# Patient Record
Sex: FEMALE | Race: WHITE | NOT HISPANIC OR LATINO | Employment: UNEMPLOYED | URBAN - METROPOLITAN AREA
[De-identification: names, ages, dates, MRNs, and addresses within clinical notes are randomized per-mention and may not be internally consistent; named-entity substitution may affect disease eponyms.]

---

## 2017-03-26 ENCOUNTER — COMMUNICATION - HEALTHEAST (OUTPATIENT)
Dept: FAMILY MEDICINE | Facility: CLINIC | Age: 47
End: 2017-03-26

## 2017-03-26 DIAGNOSIS — F34.1 DYSTHYMIC DISORDER: ICD-10-CM

## 2017-06-23 ENCOUNTER — COMMUNICATION - HEALTHEAST (OUTPATIENT)
Dept: FAMILY MEDICINE | Facility: CLINIC | Age: 47
End: 2017-06-23

## 2017-06-23 DIAGNOSIS — F34.1 DYSTHYMIC DISORDER: ICD-10-CM

## 2017-08-01 ENCOUNTER — OFFICE VISIT - HEALTHEAST (OUTPATIENT)
Dept: FAMILY MEDICINE | Facility: CLINIC | Age: 47
End: 2017-08-01

## 2017-08-01 DIAGNOSIS — R47.02 DYSPHASIA: ICD-10-CM

## 2017-08-01 DIAGNOSIS — I10 ESSENTIAL HYPERTENSION: ICD-10-CM

## 2017-08-01 DIAGNOSIS — F34.1 DYSTHYMIC DISORDER: ICD-10-CM

## 2017-08-01 DIAGNOSIS — Z00.00 ROUTINE GENERAL MEDICAL EXAMINATION AT A HEALTH CARE FACILITY: ICD-10-CM

## 2017-08-01 LAB
CHOLEST SERPL-MCNC: 247 MG/DL
FASTING STATUS PATIENT QL REPORTED: YES
HDLC SERPL-MCNC: 83 MG/DL
LDLC SERPL CALC-MCNC: 149 MG/DL
TRIGL SERPL-MCNC: 77 MG/DL

## 2017-08-01 ASSESSMENT — MIFFLIN-ST. JEOR: SCORE: 1394.72

## 2017-08-14 ENCOUNTER — COMMUNICATION - HEALTHEAST (OUTPATIENT)
Dept: FAMILY MEDICINE | Facility: CLINIC | Age: 47
End: 2017-08-14

## 2017-08-14 DIAGNOSIS — I10 HTN (HYPERTENSION): ICD-10-CM

## 2017-08-25 ENCOUNTER — HOSPITAL ENCOUNTER (OUTPATIENT)
Dept: MAMMOGRAPHY | Facility: HOSPITAL | Age: 47
Discharge: HOME | End: 2017-08-25
Attending: FAMILY MEDICINE

## 2017-08-25 DIAGNOSIS — Z00.00 ROUTINE GENERAL MEDICAL EXAMINATION AT A HEALTH CARE FACILITY: ICD-10-CM

## 2017-09-18 ENCOUNTER — COMMUNICATION - HEALTHEAST (OUTPATIENT)
Dept: FAMILY MEDICINE | Facility: CLINIC | Age: 47
End: 2017-09-18

## 2017-09-18 DIAGNOSIS — F34.1 DYSTHYMIC DISORDER: ICD-10-CM

## 2017-12-13 ENCOUNTER — COMMUNICATION - HEALTHEAST (OUTPATIENT)
Dept: FAMILY MEDICINE | Facility: CLINIC | Age: 47
End: 2017-12-13

## 2017-12-13 DIAGNOSIS — F34.1 DYSTHYMIC DISORDER: ICD-10-CM

## 2017-12-14 ENCOUNTER — AMBULATORY - HEALTHEAST (OUTPATIENT)
Dept: FAMILY MEDICINE | Facility: CLINIC | Age: 47
End: 2017-12-14

## 2017-12-14 DIAGNOSIS — F34.1 DYSTHYMIC DISORDER: ICD-10-CM

## 2018-07-24 ENCOUNTER — COMMUNICATION - HEALTHEAST (OUTPATIENT)
Dept: SCHEDULING | Facility: CLINIC | Age: 48
End: 2018-07-24

## 2018-07-25 ENCOUNTER — OFFICE VISIT - HEALTHEAST (OUTPATIENT)
Dept: FAMILY MEDICINE | Facility: CLINIC | Age: 48
End: 2018-07-25

## 2018-07-25 DIAGNOSIS — L03.90 CELLULITIS: ICD-10-CM

## 2018-07-25 ASSESSMENT — MIFFLIN-ST. JEOR: SCORE: 1478.07

## 2018-08-02 ENCOUNTER — COMMUNICATION - HEALTHEAST (OUTPATIENT)
Dept: FAMILY MEDICINE | Facility: CLINIC | Age: 48
End: 2018-08-02

## 2018-08-02 DIAGNOSIS — R47.02 DYSPHASIA: ICD-10-CM

## 2018-09-21 ENCOUNTER — COMMUNICATION - HEALTHEAST (OUTPATIENT)
Dept: FAMILY MEDICINE | Facility: CLINIC | Age: 48
End: 2018-09-21

## 2018-09-21 DIAGNOSIS — I10 HTN (HYPERTENSION): ICD-10-CM

## 2018-12-24 ENCOUNTER — COMMUNICATION - HEALTHEAST (OUTPATIENT)
Dept: FAMILY MEDICINE | Facility: CLINIC | Age: 48
End: 2018-12-24

## 2018-12-24 DIAGNOSIS — I10 HTN (HYPERTENSION): ICD-10-CM

## 2018-12-24 DIAGNOSIS — F34.1 DYSTHYMIC DISORDER: ICD-10-CM

## 2019-01-20 ENCOUNTER — COMMUNICATION - HEALTHEAST (OUTPATIENT)
Dept: FAMILY MEDICINE | Facility: CLINIC | Age: 49
End: 2019-01-20

## 2019-01-20 DIAGNOSIS — F34.1 DYSTHYMIC DISORDER: ICD-10-CM

## 2019-01-20 DIAGNOSIS — I10 HTN (HYPERTENSION): ICD-10-CM

## 2019-01-28 ENCOUNTER — OFFICE VISIT - HEALTHEAST (OUTPATIENT)
Dept: FAMILY MEDICINE | Facility: CLINIC | Age: 49
End: 2019-01-28

## 2019-01-28 DIAGNOSIS — R47.02 DYSPHASIA: ICD-10-CM

## 2019-01-28 DIAGNOSIS — Z12.31 ENCOUNTER FOR SCREENING MAMMOGRAM FOR BREAST CANCER: ICD-10-CM

## 2019-01-28 DIAGNOSIS — I10 HTN (HYPERTENSION): ICD-10-CM

## 2019-01-28 DIAGNOSIS — F34.1 DYSTHYMIC DISORDER: ICD-10-CM

## 2019-01-28 DIAGNOSIS — E78.00 HYPERCHOLESTEREMIA: ICD-10-CM

## 2019-01-28 LAB
ALBUMIN SERPL-MCNC: 3.6 G/DL (ref 3.5–5)
ALP SERPL-CCNC: 51 U/L (ref 45–120)
ALT SERPL W P-5'-P-CCNC: 72 U/L (ref 0–45)
ANION GAP SERPL CALCULATED.3IONS-SCNC: 9 MMOL/L (ref 5–18)
AST SERPL W P-5'-P-CCNC: 43 U/L (ref 0–40)
BILIRUB SERPL-MCNC: 0.3 MG/DL (ref 0–1)
BUN SERPL-MCNC: 14 MG/DL (ref 8–22)
CALCIUM SERPL-MCNC: 9.5 MG/DL (ref 8.5–10.5)
CHLORIDE BLD-SCNC: 106 MMOL/L (ref 98–107)
CHOLEST SERPL-MCNC: 245 MG/DL
CO2 SERPL-SCNC: 26 MMOL/L (ref 22–31)
CREAT SERPL-MCNC: 0.75 MG/DL (ref 0.6–1.1)
ERYTHROCYTE [DISTWIDTH] IN BLOOD BY AUTOMATED COUNT: 11.5 % (ref 11–14.5)
FASTING STATUS PATIENT QL REPORTED: YES
GFR SERPL CREATININE-BSD FRML MDRD: >60 ML/MIN/1.73M2
GLUCOSE BLD-MCNC: 91 MG/DL (ref 70–125)
HCT VFR BLD AUTO: 43.2 % (ref 35–47)
HDLC SERPL-MCNC: 82 MG/DL
HGB BLD-MCNC: 14.3 G/DL (ref 12–16)
LDLC SERPL CALC-MCNC: 146 MG/DL
MCH RBC QN AUTO: 30.9 PG (ref 27–34)
MCHC RBC AUTO-ENTMCNC: 33.2 G/DL (ref 32–36)
MCV RBC AUTO: 93 FL (ref 80–100)
PLATELET # BLD AUTO: 256 THOU/UL (ref 140–440)
PMV BLD AUTO: 8.3 FL (ref 7–10)
POTASSIUM BLD-SCNC: 4.3 MMOL/L (ref 3.5–5)
PROT SERPL-MCNC: 6.6 G/DL (ref 6–8)
RBC # BLD AUTO: 4.63 MILL/UL (ref 3.8–5.4)
SODIUM SERPL-SCNC: 141 MMOL/L (ref 136–145)
TRIGL SERPL-MCNC: 84 MG/DL
WBC: 3.4 THOU/UL (ref 4–11)

## 2019-01-28 ASSESSMENT — MIFFLIN-ST. JEOR: SCORE: 1517.19

## 2019-02-22 ENCOUNTER — COMMUNICATION - HEALTHEAST (OUTPATIENT)
Dept: FAMILY MEDICINE | Facility: CLINIC | Age: 49
End: 2019-02-22

## 2019-02-22 DIAGNOSIS — F34.1 DYSTHYMIC DISORDER: ICD-10-CM

## 2019-03-21 ENCOUNTER — COMMUNICATION - HEALTHEAST (OUTPATIENT)
Dept: FAMILY MEDICINE | Facility: CLINIC | Age: 49
End: 2019-03-21

## 2019-06-26 ENCOUNTER — COMMUNICATION - HEALTHEAST (OUTPATIENT)
Dept: FAMILY MEDICINE | Facility: CLINIC | Age: 49
End: 2019-06-26

## 2019-08-22 ENCOUNTER — COMMUNICATION - HEALTHEAST (OUTPATIENT)
Dept: FAMILY MEDICINE | Facility: CLINIC | Age: 49
End: 2019-08-22

## 2019-08-30 ENCOUNTER — COMMUNICATION - HEALTHEAST (OUTPATIENT)
Dept: FAMILY MEDICINE | Facility: CLINIC | Age: 49
End: 2019-08-30

## 2019-09-23 ENCOUNTER — COMMUNICATION - HEALTHEAST (OUTPATIENT)
Dept: FAMILY MEDICINE | Facility: CLINIC | Age: 49
End: 2019-09-23

## 2019-09-23 DIAGNOSIS — R47.02 DYSPHASIA: ICD-10-CM

## 2019-09-23 DIAGNOSIS — I10 HTN (HYPERTENSION): ICD-10-CM

## 2019-09-23 DIAGNOSIS — F34.1 DYSTHYMIC DISORDER: ICD-10-CM

## 2019-09-23 RX ORDER — OMEPRAZOLE 40 MG/1
CAPSULE, DELAYED RELEASE ORAL
Qty: 90 CAPSULE | Refills: 1 | Status: SHIPPED | OUTPATIENT
Start: 2019-09-23

## 2019-09-24 ENCOUNTER — COMMUNICATION - HEALTHEAST (OUTPATIENT)
Dept: FAMILY MEDICINE | Facility: CLINIC | Age: 49
End: 2019-09-24

## 2019-09-24 DIAGNOSIS — R13.10 DYSPHAGIA, UNSPECIFIED TYPE: ICD-10-CM

## 2020-01-10 ENCOUNTER — COMMUNICATION - HEALTHEAST (OUTPATIENT)
Dept: FAMILY MEDICINE | Facility: CLINIC | Age: 50
End: 2020-01-10

## 2020-03-17 ENCOUNTER — RECORDS - HEALTHEAST (OUTPATIENT)
Dept: FAMILY MEDICINE | Facility: CLINIC | Age: 50
End: 2020-03-17

## 2020-03-17 ENCOUNTER — COMMUNICATION - HEALTHEAST (OUTPATIENT)
Dept: FAMILY MEDICINE | Facility: CLINIC | Age: 50
End: 2020-03-17

## 2020-03-17 DIAGNOSIS — F34.1 DYSTHYMIC DISORDER: ICD-10-CM

## 2020-03-17 DIAGNOSIS — I10 HTN (HYPERTENSION): ICD-10-CM

## 2020-08-18 ENCOUNTER — OFFICE VISIT - HEALTHEAST (OUTPATIENT)
Dept: FAMILY MEDICINE | Facility: CLINIC | Age: 50
End: 2020-08-18

## 2020-08-18 DIAGNOSIS — R63.5 WEIGHT GAIN: ICD-10-CM

## 2020-08-18 DIAGNOSIS — R00.2 PALPITATIONS: ICD-10-CM

## 2020-08-18 DIAGNOSIS — R68.89 SENSATION OF FEELING HOT: ICD-10-CM

## 2020-08-18 DIAGNOSIS — I10 ESSENTIAL HYPERTENSION: ICD-10-CM

## 2020-08-18 LAB
ALBUMIN SERPL-MCNC: 3.8 G/DL (ref 3.5–5)
ALP SERPL-CCNC: 56 U/L (ref 45–120)
ALT SERPL W P-5'-P-CCNC: 77 U/L (ref 0–45)
ANION GAP SERPL CALCULATED.3IONS-SCNC: 12 MMOL/L (ref 5–18)
AST SERPL W P-5'-P-CCNC: 40 U/L (ref 0–40)
BILIRUB SERPL-MCNC: 0.3 MG/DL (ref 0–1)
BUN SERPL-MCNC: 12 MG/DL (ref 8–22)
CALCIUM SERPL-MCNC: 10 MG/DL (ref 8.5–10.5)
CHLORIDE BLD-SCNC: 103 MMOL/L (ref 98–107)
CO2 SERPL-SCNC: 24 MMOL/L (ref 22–31)
CREAT SERPL-MCNC: 0.78 MG/DL (ref 0.6–1.1)
ERYTHROCYTE [DISTWIDTH] IN BLOOD BY AUTOMATED COUNT: 11.9 % (ref 11–14.5)
FSH SERPL-ACNC: 12.2 MIU/ML
GFR SERPL CREATININE-BSD FRML MDRD: >60 ML/MIN/1.73M2
GLUCOSE BLD-MCNC: 96 MG/DL (ref 70–125)
HCT VFR BLD AUTO: 48.7 % (ref 35–47)
HGB BLD-MCNC: 15.7 G/DL (ref 12–16)
MCH RBC QN AUTO: 30.4 PG (ref 27–34)
MCHC RBC AUTO-ENTMCNC: 32.2 G/DL (ref 32–36)
MCV RBC AUTO: 95 FL (ref 80–100)
PLATELET # BLD AUTO: 272 THOU/UL (ref 140–440)
PMV BLD AUTO: 8.7 FL (ref 7–10)
POTASSIUM BLD-SCNC: 4.7 MMOL/L (ref 3.5–5)
PROT SERPL-MCNC: 7 G/DL (ref 6–8)
RBC # BLD AUTO: 5.15 MILL/UL (ref 3.8–5.4)
SODIUM SERPL-SCNC: 139 MMOL/L (ref 136–145)
TSH SERPL DL<=0.005 MIU/L-ACNC: 1.94 UIU/ML (ref 0.3–5)
WBC: 5.1 THOU/UL (ref 4–11)

## 2020-08-18 ASSESSMENT — MIFFLIN-ST. JEOR: SCORE: 1534.43

## 2020-09-01 ENCOUNTER — OFFICE VISIT - HEALTHEAST (OUTPATIENT)
Dept: FAMILY MEDICINE | Facility: CLINIC | Age: 50
End: 2020-09-01

## 2020-09-01 DIAGNOSIS — I10 ESSENTIAL HYPERTENSION: ICD-10-CM

## 2020-09-01 DIAGNOSIS — R68.89 SENSATION OF FEELING HOT: ICD-10-CM

## 2020-09-01 DIAGNOSIS — Z23 NEED FOR IMMUNIZATION AGAINST INFLUENZA: ICD-10-CM

## 2020-09-01 ASSESSMENT — MIFFLIN-ST. JEOR: SCORE: 1538.06

## 2020-09-03 ENCOUNTER — HOSPITAL ENCOUNTER (OUTPATIENT)
Dept: MAMMOGRAPHY | Facility: CLINIC | Age: 50
Discharge: HOME | End: 2020-09-03
Attending: FAMILY MEDICINE

## 2020-09-03 DIAGNOSIS — Z12.31 VISIT FOR SCREENING MAMMOGRAM: ICD-10-CM

## 2020-09-05 LAB
ANNOTATION COMMENT IMP: NORMAL
METANEPHS SERPL-SCNC: 0.19 NMOL/L (ref 0–0.49)
NORMETANEPHRINE SERPL-SCNC: 0.62 NMOL/L (ref 0–0.89)

## 2020-09-08 ENCOUNTER — COMMUNICATION - HEALTHEAST (OUTPATIENT)
Dept: FAMILY MEDICINE | Facility: CLINIC | Age: 50
End: 2020-09-08

## 2020-09-08 DIAGNOSIS — F34.1 DYSTHYMIC DISORDER: ICD-10-CM

## 2020-09-08 DIAGNOSIS — I10 HTN (HYPERTENSION): ICD-10-CM

## 2020-09-09 ENCOUNTER — OFFICE VISIT - HEALTHEAST (OUTPATIENT)
Dept: FAMILY MEDICINE | Facility: CLINIC | Age: 50
End: 2020-09-09

## 2020-09-09 RX ORDER — VENLAFAXINE HYDROCHLORIDE 150 MG/1
CAPSULE, EXTENDED RELEASE ORAL
Qty: 90 CAPSULE | Refills: 1 | Status: SHIPPED | OUTPATIENT
Start: 2020-09-09 | End: 2021-07-27

## 2020-09-09 ASSESSMENT — PATIENT HEALTH QUESTIONNAIRE - PHQ9: SUM OF ALL RESPONSES TO PHQ QUESTIONS 1-9: 2

## 2020-09-15 ENCOUNTER — AMBULATORY - HEALTHEAST (OUTPATIENT)
Dept: NURSING | Facility: CLINIC | Age: 50
End: 2020-09-15

## 2020-09-15 DIAGNOSIS — I10 ESSENTIAL HYPERTENSION: ICD-10-CM

## 2020-09-16 ENCOUNTER — COMMUNICATION - HEALTHEAST (OUTPATIENT)
Dept: FAMILY MEDICINE | Facility: CLINIC | Age: 50
End: 2020-09-16

## 2021-03-01 ENCOUNTER — COMMUNICATION - HEALTHEAST (OUTPATIENT)
Dept: FAMILY MEDICINE | Facility: CLINIC | Age: 51
End: 2021-03-01

## 2021-03-04 ENCOUNTER — OFFICE VISIT - HEALTHEAST (OUTPATIENT)
Dept: FAMILY MEDICINE | Facility: CLINIC | Age: 51
End: 2021-03-04

## 2021-03-04 DIAGNOSIS — F34.1 DYSTHYMIC DISORDER: ICD-10-CM

## 2021-03-04 DIAGNOSIS — I10 ESSENTIAL HYPERTENSION: ICD-10-CM

## 2021-03-04 DIAGNOSIS — Z00.00 ROUTINE GENERAL MEDICAL EXAMINATION AT A HEALTH CARE FACILITY: ICD-10-CM

## 2021-03-04 DIAGNOSIS — R68.89 SENSATION OF FEELING HOT: ICD-10-CM

## 2021-03-04 RX ORDER — LISINOPRIL AND HYDROCHLOROTHIAZIDE 20; 25 MG/1; MG/1
1 TABLET ORAL DAILY
Qty: 90 TABLET | Refills: 3 | Status: SHIPPED | OUTPATIENT
Start: 2021-03-04 | End: 2022-01-07

## 2021-03-04 RX ORDER — VENLAFAXINE 75 MG/1
TABLET ORAL
Qty: 14 TABLET | Refills: 0 | Status: SHIPPED | OUTPATIENT
Start: 2021-03-04 | End: 2021-07-27

## 2021-03-04 ASSESSMENT — ANXIETY QUESTIONNAIRES
3. WORRYING TOO MUCH ABOUT DIFFERENT THINGS: SEVERAL DAYS
7. FEELING AFRAID AS IF SOMETHING AWFUL MIGHT HAPPEN: NOT AT ALL
2. NOT BEING ABLE TO STOP OR CONTROL WORRYING: NOT AT ALL
4. TROUBLE RELAXING: NOT AT ALL
1. FEELING NERVOUS, ANXIOUS, OR ON EDGE: SEVERAL DAYS
6. BECOMING EASILY ANNOYED OR IRRITABLE: NOT AT ALL
GAD7 TOTAL SCORE: 2
5. BEING SO RESTLESS THAT IT IS HARD TO SIT STILL: NOT AT ALL

## 2021-03-04 ASSESSMENT — PATIENT HEALTH QUESTIONNAIRE - PHQ9: SUM OF ALL RESPONSES TO PHQ QUESTIONS 1-9: 1

## 2021-03-04 ASSESSMENT — MIFFLIN-ST. JEOR: SCORE: 1539.87

## 2021-03-05 LAB
HPV SOURCE: NORMAL
HUMAN PAPILLOMA VIRUS 16 DNA: NEGATIVE
HUMAN PAPILLOMA VIRUS 18 DNA: NEGATIVE
HUMAN PAPILLOMA VIRUS FINAL DIAGNOSIS: NORMAL
HUMAN PAPILLOMA VIRUS OTHER HR: NEGATIVE
SPECIMEN DESCRIPTION: NORMAL

## 2021-03-08 ENCOUNTER — COMMUNICATION - HEALTHEAST (OUTPATIENT)
Dept: FAMILY MEDICINE | Facility: CLINIC | Age: 51
End: 2021-03-08

## 2021-03-08 DIAGNOSIS — F34.1 DYSTHYMIC DISORDER: ICD-10-CM

## 2021-03-15 LAB
BKR LAB AP ABNORMAL BLEEDING: NO
BKR LAB AP BIRTH CONTROL/HORMONES: NORMAL
BKR LAB AP CERVICAL APPEARANCE: NORMAL
BKR LAB AP GYN ADEQUACY: NORMAL
BKR LAB AP GYN INTERPRETATION: NORMAL
BKR LAB AP HPV REFLEX: NORMAL
BKR LAB AP LMP: NORMAL
BKR LAB AP PATIENT STATUS: NO
BKR LAB AP PREVIOUS ABNORMAL: NO
BKR LAB AP PREVIOUS NORMAL: 2016
HIGH RISK?: NO
PATH REPORT.COMMENTS IMP SPEC: NORMAL
RESULT FLAG (HE HISTORICAL CONVERSION): NORMAL

## 2021-03-24 ENCOUNTER — COMMUNICATION - HEALTHEAST (OUTPATIENT)
Dept: FAMILY MEDICINE | Facility: CLINIC | Age: 51
End: 2021-03-24

## 2021-05-27 VITALS — HEART RATE: 99 BPM | DIASTOLIC BLOOD PRESSURE: 88 MMHG | SYSTOLIC BLOOD PRESSURE: 139 MMHG

## 2021-05-27 ASSESSMENT — PATIENT HEALTH QUESTIONNAIRE - PHQ9
SUM OF ALL RESPONSES TO PHQ QUESTIONS 1-9: 2
SUM OF ALL RESPONSES TO PHQ QUESTIONS 1-9: 1

## 2021-05-28 ASSESSMENT — ANXIETY QUESTIONNAIRES: GAD7 TOTAL SCORE: 2

## 2021-05-31 VITALS — WEIGHT: 166 LBS | HEIGHT: 66 IN | BODY MASS INDEX: 26.68 KG/M2

## 2021-05-31 NOTE — TELEPHONE ENCOUNTER
Central PA team  [phone]  Pool: HE PA MED ([id number])          PA has been initiated.       PA form completed and faxed insurance via Cover My Meds     Key:  [id number]     Medication:  Omeprazole 40MG dr capsules    Insurance:  [facility]        Response will be received via fax and may take up to 5-10 business days depending on plan

## 2021-05-31 NOTE — TELEPHONE ENCOUNTER
Fax received from [facility] pharmacy requesting Prior Authorization    Medication Name:   omeprazole (PRILOSEC) 40 MG capsule 90 capsule 3 1/28/2019     Sig: TAKE ONE CAPSULE BY MOUTH EVERY DAY BEFORE BREAKFAST    Sent to pharmacy as: omeprazole (PRILOSEC) 40 MG capsule    E-Prescribing Status: Receipt confirmed by pharmacy (1/28/2019 11:53 AM CST)          Insurance Plan: [employer]   PBM: [facility]   Patient ID Number: [id number]    Please start PA process

## 2021-06-01 VITALS — WEIGHT: 184.38 LBS | HEIGHT: 66 IN | BODY MASS INDEX: 29.63 KG/M2

## 2021-06-01 NOTE — TELEPHONE ENCOUNTER
Refill Request  Did you contact pharmacy: No  Medication name:   Requested Prescriptions     Pending Prescriptions Disp Refills     omeprazole (PRILOSEC) 40 MG capsule 90 capsule 0     Sig: TAKE ONE CAPSULE BY MOUTH EVERY DAY BEFORE BREAKFAST     venlafaxine (EFFEXOR-XR) 150 MG 24 hr capsule 90 capsule 0     Sig: TAKE ONE CAPSULE BY MOUTH ONCE DAILY     lisinopril (PRINIVIL,ZESTRIL) 10 MG tablet 90 tablet 0     Sig: Take 1 tablet (10 mg total) by mouth daily.     Who prescribed the medication: pcp  Pharm[facility] Name and Location[facility]  Is patient out of medication: No.  1 days left  Patient notified refills processed in 72 hours:  yes  Okay to leave a detailed message: yes

## 2021-06-01 NOTE — TELEPHONE ENCOUNTER
Refill Approved    Rx's renewed per Medication Renewal Policy. Medications last renewed on 1/28/2019.  Last OV 1/28/2019.    Joanne Mckinney, [facility] Triage/Med Refill 9/23/2019     Requested Prescriptions   Pending Prescriptions Disp Refills     omeprazole (PRILOSEC) 40 MG capsule 90 capsule 0     Sig: TAKE ONE CAPSULE BY MOUTH EVERY DAY BEFORE BREAKFAST       GI Medications Refill Protocol Passed - 9/23/2019  1:19 PM        Passed - PCP or prescribing provider visit in last 12 or next 3 months.     Last office visit with prescriber/PCP: 1/28/2019 Magalys Sigala MD OR same dept: 1/28/2019 Magalys Sigala MD OR same specialty: 1/28/2019 Magalys Sigala MD  Last physical: 8/1/2017 Last MTM visit: Visit date not found   Next visit within 3 mo: Visit date not found  Next physical within 3 mo: Visit date not found  Prescriber OR PCP: Magalys Sigala MD  Last diagnosis associated with med order: 1. Dysphasia  - omeprazole (PRILOSEC) 40 MG capsule; TAKE ONE CAPSULE BY MOUTH EVERY DAY BEFORE BREAKFAST  Dispense: 90 capsule; Refill: 0    2. Dysthymic disorder  - venlafaxine (EFFEXOR-XR) 150 MG 24 hr capsule; TAKE ONE CAPSULE BY MOUTH ONCE DAILY  Dispense: 90 capsule; Refill: 0    3. HTN (hypertension)  - lisinopril (PRINIVIL,ZESTRIL) 10 MG tablet; Take 1 tablet (10 mg total) by mouth daily.  Dispense: 90 tablet; Refill: 0    If protocol passes may refill for 12 months if within 3 months of last provider visit (or a total of 15 months).             venlafaxine (EFFEXOR-XR) 150 MG 24 hr capsule 90 capsule 0     Sig: TAKE ONE CAPSULE BY MOUTH ONCE DAILY       Venlafaxine/Desvenlafaxine Refill Protocol Passed - 9/23/2019  1:19 PM        Passed - LFT or AST or ALT in last year     Albumin   Date Value Ref Range Status   01/28/2019 3.6 3.5 - 5.0 g/dL Final     Bilirubin, Total   Date Value Ref Range Status   01/28/2019 0.3 0.0 - 1.0 mg/dL Final     Alkaline Phosphatase   Date Value Ref Range Status   01/28/2019 51 45 - 120  U/L Final     AST   Date Value Ref Range Status   01/28/2019 43 (H) 0 - 40 U/L Final     ALT   Date Value Ref Range Status   01/28/2019 72 (H) 0 - 45 U/L Final     Protein, Total   Date Value Ref Range Status   01/28/2019 6.6 6.0 - 8.0 g/dL Final                Passed - Fasting lipid cascade in last year     Cholesterol   Date Value Ref Range Status   01/28/2019 245 (H) <=199 mg/dL Final     Triglycerides   Date Value Ref Range Status   01/28/2019 84 <=149 mg/dL Final     HDL Cholesterol   Date Value Ref Range Status   01/28/2019 82 >=50 mg/dL Final     LDL Calculated   Date Value Ref Range Status   01/28/2019 146 (H) <=129 mg/dL Final     Patient Fasting > 8hrs?   Date Value Ref Range Status   01/28/2019 Yes  Final             Passed - PCP or prescribing provider visit in last year     Last office visit with prescriber/PCP: 1/28/2019 Magalys Sigala MD OR same dept: 1/28/2019 Magalys Sigala MD OR same specialty: 1/28/2019 Magalys Sigala MD  Last physical: 8/1/2017 Last MTM visit: Visit date not found   Next visit within 3 mo: Visit date not found  Next physical within 3 mo: Visit date not found  Prescriber OR PCP: Magalys Sigala MD  Last diagnosis associated with med order: 1. Dysphasia  - omeprazole (PRILOSEC) 40 MG capsule; TAKE ONE CAPSULE BY MOUTH EVERY DAY BEFORE BREAKFAST  Dispense: 90 capsule; Refill: 0    2. Dysthymic disorder  - venlafaxine (EFFEXOR-XR) 150 MG 24 hr capsule; TAKE ONE CAPSULE BY MOUTH ONCE DAILY  Dispense: 90 capsule; Refill: 0    3. HTN (hypertension)  - lisinopril (PRINIVIL,ZESTRIL) 10 MG tablet; Take 1 tablet (10 mg total) by mouth daily.  Dispense: 90 tablet; Refill: 0    If protocol passes may refill for 12 months if within 3 months of last provider visit (or a total of 15 months).             Passed - Blood Pressure in last year     BP Readings from Last 1 Encounters:   01/28/19 (!) 158/94             lisinopril (PRINIVIL,ZESTRIL) 10 MG tablet 90 tablet 0     Sig: Take 1 tablet (10 mg  total) by mouth daily.       Ace Inhibitors Refill Protocol Passed - 9/23/2019  1:19 PM        Passed - PCP or prescribing provider visit in past 12 months       Last office visit with prescriber/PCP: 1/28/2019 Magalys Sigala MD OR same dept: 1/28/2019 Magalys Sigala MD OR same specialty: 1/28/2019 Magalys Sigala MD  Last physical: 8/1/2017 Last MTM visit: Visit date not found   Next visit within 3 mo: Visit date not found  Next physical within 3 mo: Visit date not found  Prescriber OR PCP: Magalys Sigala MD  Last diagnosis associated with med order: 1. Dysphasia  - omeprazole (PRILOSEC) 40 MG capsule; TAKE ONE CAPSULE BY MOUTH EVERY DAY BEFORE BREAKFAST  Dispense: 90 capsule; Refill: 0    2. Dysthymic disorder  - venlafaxine (EFFEXOR-XR) 150 MG 24 hr capsule; TAKE ONE CAPSULE BY MOUTH ONCE DAILY  Dispense: 90 capsule; Refill: 0    3. HTN (hypertension)  - lisinopril (PRINIVIL,ZESTRIL) 10 MG tablet; Take 1 tablet (10 mg total) by mouth daily.  Dispense: 90 tablet; Refill: 0    If protocol passes may refill for 12 months if within 3 months of last provider visit (or a total of 15 months).             Passed - Serum Potassium in past 12 months     Lab Results   Component Value Date    Potassium 4.3 01/28/2019             Passed - Blood pressure filed in past 12 months     BP Readings from Last 1 Encounters:   01/28/19 (!) 158/94             Passed - Serum Creatinine in past 12 months     Creatinine   Date Value Ref Range Status   01/28/2019 0.75 0.60 - 1.10 mg/dL Final

## 2021-06-01 NOTE — TELEPHONE ENCOUNTER
Medication Question or Clarification  Who is calling: Pharmacy: incoming fax from [facility] # [id number]  What medication are you calling about? (include dose and sig)     omeprazole (PRILOSEC) 40 MG capsule 90 capsule 1 9/23/2019     Sig: TAKE ONE CAPSULE BY MOUTH EVERY DAY BEFORE BREAKFAST      Who prescribed the medication?: pcp  What is your question/concern?: Alternative requested, insurance does not cover this medication.  Pharmacy: [facility] # [id number]  Okay to leave a detailed message?: Yes  Site CMT - Please call the pharmacy to obtain any additional needed information.

## 2021-06-01 NOTE — TELEPHONE ENCOUNTER
Dr. Sigala-  Insurance did not let pharmacy know covered alternatives. Pharmacy will not know until rx is ran through the system.  Please send alternative or I can have pt contact her insurance.

## 2021-06-02 VITALS — HEIGHT: 66 IN | BODY MASS INDEX: 31.02 KG/M2 | WEIGHT: 193 LBS

## 2021-06-04 VITALS
HEIGHT: 66 IN | SYSTOLIC BLOOD PRESSURE: 148 MMHG | DIASTOLIC BLOOD PRESSURE: 92 MMHG | WEIGHT: 197.6 LBS | HEART RATE: 84 BPM | BODY MASS INDEX: 31.76 KG/M2

## 2021-06-04 VITALS
SYSTOLIC BLOOD PRESSURE: 177 MMHG | BODY MASS INDEX: 31.63 KG/M2 | HEART RATE: 89 BPM | HEIGHT: 66 IN | DIASTOLIC BLOOD PRESSURE: 103 MMHG | WEIGHT: 196.8 LBS

## 2021-06-05 VITALS
DIASTOLIC BLOOD PRESSURE: 82 MMHG | HEART RATE: 92 BPM | BODY MASS INDEX: 31.82 KG/M2 | WEIGHT: 198 LBS | SYSTOLIC BLOOD PRESSURE: 134 MMHG | RESPIRATION RATE: 16 BRPM | HEIGHT: 66 IN

## 2021-06-05 NOTE — TELEPHONE ENCOUNTER
If she is otherwise healthy (which it appears she is) with no history of breathing difficulty, asthma or conditions that would decrease her immune system the CDC recommends against Tamiflu as it has shown that the harms (nausea/vomiting) outweigh any benefits    Certainly you could send this to Dr. Sigala again as she knows the patient better than I to get her opinion.    BB

## 2021-06-05 NOTE — TELEPHONE ENCOUNTER
Medication Request    Medication name:   Tamiflu    Requested Pharmacy:   [facility]/PH[facility]ACY #[id number] - [place] [id number]     Reason for request:  Requesting prophylactic therapy due to son testing POS for Inf A at [facility]  Alvarez Villavicencio 2003    States she starting to feel congested x 1 day.    When did you use medication last?:  NA    Patient offered appointment:    patient declined     Okay to leave a detailed message: yes         Please send Rx and advise patient of the outcome.

## 2021-06-06 NOTE — TELEPHONE ENCOUNTER
RN cannot approve Refill Request    RN can NOT refill this medication Protocol failed and NO refill given. Last office visit: 1/28/2019 Magalys Sigala MD Last Physical: 8/1/2017 Last MTM visit: Visit date not found Last visit same specialty: 1/28/2019 Magalys Sigala MD.  Next visit within 3 mo: Visit date not found  Next physical within 3 mo: Visit date not found      Gisele Porter, [facility] Triage/Med Refill 3/17/2020    Requested Prescriptions   Pending Prescriptions Disp Refills     venlafaxine (EFFEXOR-XR) 150 MG 24 hr capsule [Pharmacy Med Name: VENLAFAXINE HCL  MG CAP] 90 capsule 1     Sig: TAKE 1 CAPSULE BY MOUTH EVERY DAY       Venlafaxine/Desvenlafaxine Refill Protocol Failed - 3/17/2020  2:13 AM        Failed - LFT or AST or ALT in last year     Albumin   Date Value Ref Range Status   01/28/2019 3.6 3.5 - 5.0 g/dL Final     Bilirubin, Total   Date Value Ref Range Status   01/28/2019 0.3 0.0 - 1.0 mg/dL Final     Alkaline Phosphatase   Date Value Ref Range Status   01/28/2019 51 45 - 120 U/L Final     AST   Date Value Ref Range Status   01/28/2019 43 (H) 0 - 40 U/L Final     ALT   Date Value Ref Range Status   01/28/2019 72 (H) 0 - 45 U/L Final     Protein, Total   Date Value Ref Range Status   01/28/2019 6.6 6.0 - 8.0 g/dL Final                Failed - Fasting lipid cascade in last year     Cholesterol   Date Value Ref Range Status   01/28/2019 245 (H) <=199 mg/dL Final     Triglycerides   Date Value Ref Range Status   01/28/2019 84 <=149 mg/dL Final     HDL Cholesterol   Date Value Ref Range Status   01/28/2019 82 >=50 mg/dL Final     LDL Calculated   Date Value Ref Range Status   01/28/2019 146 (H) <=129 mg/dL Final     Patient Fasting > 8hrs?   Date Value Ref Range Status   01/28/2019 Yes  Final             Failed - PCP or prescribing provider visit in last year     Last office visit with prescriber/PCP: 1/28/2019 Magalys Sigala MD OR same dept: Visit date not found OR same specialty:  1/28/2019 Magalys Sigala MD  Last physical: 8/1/2017 Last MTM visit: Visit date not found   Next visit within 3 mo: Visit date not found  Next physical within 3 mo: Visit date not found  Prescriber OR PCP: Magalys Sigala MD  Last diagnosis associated with med order: 1. Dysthymic disorder  - venlafaxine (EFFEXOR-XR) 150 MG 24 hr capsule [Pharmacy Med Name: VENLAFAXINE HCL  MG CAP]; TAKE 1 CAPSULE BY MOUTH EVERY DAY  Dispense: 90 capsule; Refill: 1    2. HTN (hypertension)  - lisinopriL (PRINIVIL,ZESTRIL) 10 MG tablet [Pharmacy Med Name: LISINOPRIL 10 MG TABLET]; TAKE 1 TABLET BY MOUTH EVERY DAY  Dispense: 90 tablet; Refill: 1    If protocol passes may refill for 12 months if within 3 months of last provider visit (or a total of 15 months).             Failed - Blood Pressure in last year     BP Readings from Last 1 Encounters:   01/28/19 (!) 158/94                lisinopriL (PRINIVIL,ZESTRIL) 10 MG tablet [Pharmacy Med Name: LISINOPRIL 10 MG TABLET] 90 tablet 1     Sig: TAKE 1 TABLET BY MOUTH EVERY DAY       Ace Inhibitors Refill Protocol Failed - 3/17/2020  2:13 AM        Failed - PCP or prescribing provider visit in past 12 months       Last office visit with prescriber/PCP: 1/28/2019 Magalys Sigala MD OR same dept: Visit date not found OR same specialty: 1/28/2019 Magalys Sigala MD  Last physical: 8/1/2017 Last MTM visit: Visit date not found   Next visit within 3 mo: Visit date not found  Next physical within 3 mo: Visit date not found  Prescriber OR PCP: Magalys Sigala MD  Last diagnosis associated with med order: 1. Dysthymic disorder  - venlafaxine (EFFEXOR-XR) 150 MG 24 hr capsule [Pharmacy Med Name: VENLAFAXINE HCL  MG CAP]; TAKE 1 CAPSULE BY MOUTH EVERY DAY  Dispense: 90 capsule; Refill: 1    2. HTN (hypertension)  - lisinopriL (PRINIVIL,ZESTRIL) 10 MG tablet [Pharmacy Med Name: LISINOPRIL 10 MG TABLET]; TAKE 1 TABLET BY MOUTH EVERY DAY  Dispense: 90 tablet; Refill: 1    If protocol passes may  refill for 12 months if within 3 months of last provider visit (or a total of 15 months).             Failed - Serum Potassium in past 12 months     No results found for: LN-POTASSIUM          Failed - Blood pressure filed in past 12 months     BP Readings from Last 1 Encounters:   01/28/19 (!) 158/94             Failed - Serum Creatinine in past 12 months     Creatinine   Date Value Ref Range Status   01/28/2019 0.75 0.60 - 1.10 mg/dL Final

## 2021-06-10 NOTE — PROGRESS NOTES
Assessment/Plan:       1. Essential hypertension  Labs were updated as below and unrevealing as a cause of patient's sudden increase in blood pressure or hot flashes.  Recommended changing to lisinopril-hydrochlorothiazide combination pill as below.  She will try to check her blood pressure outside the clinic over the next couple of weeks and send readings to myself or Dr. Sigala.  - Comprehensive Metabolic Panel  - Thyroid Cascade  - HM2(CBC w/o Differential)  - lisinopriL-hydrochlorothiazide (ZESTORETIC) 20-12.5 mg per tablet; Take 1 tablet by mouth daily.  Dispense: 90 tablet; Refill: 3    2. Sensation of feeling hot  Unclear etiology, this is less hot flashes and more of a constant hot, sweaty sensation.  Lab work-up as below is unrevealing.  Unlikely to be something like pheochromocytoma without sudden onset hot flashes with hypertension.  This appears to be more of a consistent finding.  Patient is not obviously menopausal based on FSH today.  - Comprehensive Metabolic Panel  - Thyroid Cascade  - HM2(CBC w/o Differential)  - Follicle Stimulating Hormone (FSH)    3. Weight gain  Labs as below also unrevealing in this regard.  Discussed healthy, low-carb, low sugar diet and regular cardiovascular exercise.  - Comprehensive Metabolic Panel  - Thyroid Cascade  - Follicle Stimulating Hormone (FSH)    4. Palpitations  Recommended that we get her blood pressure under good control.  If she continues to have palpitations despite adequate control of her blood pressure, we can order ambulatory monitoring at that time.  Certainly if this becomes constant or symptomatic, she can follow-up more urgently.  - Comprehensive Metabolic Panel  - Thyroid Cascade        Subjective:       Minoo Villavicencio is a 50 y.o. female who presents for evaluation of feeling hot and sweaty, weight gain, and palpitations.  Patient experiences palpitations daily or every other day.  These last only seconds.  She denies any chest pain, possibly  "feels a bit more breathless than usual.  She has been under a lot of stress recently.  Her father had atrial fibrillation.  She describes feeling like her heart is \"fluttering\", like butterflies in her stomach.  She also feels hot and sweaty much of the time.  She denies hot flashes, rather just feels warm all the time.  She states she is gaining weight despite not changing her diet or exercise routine.  She denies paroxysmal nocturnal dyspnea, denies lower extremity edema.    Labs Reviewed:  Lipid cascade, CMP, Hm2 1/2019    The following portions of the patient's history were reviewed and updated as appropriate: allergies, current medications, past family history, past medical history, past social history and problem list.      Current Outpatient Medications:      lisinopriL (PRINIVIL,ZESTRIL) 10 MG tablet, TAKE 1 TABLET BY MOUTH EVERY DAY, Disp: 90 tablet, Rfl: 1     omeprazole (PRILOSEC) 40 MG capsule, TAKE ONE CAPSULE BY MOUTH EVERY DAY BEFORE BREAKFAST, Disp: 90 capsule, Rfl: 1     venlafaxine (EFFEXOR-XR) 150 MG 24 hr capsule, TAKE ONE CAPSULE BY MOUTH ONCE DAILY (NEED TO BE SEEN), Disp: 90 capsule, Rfl: 3     famotidine (PEPCID) 40 MG tablet, Take 1 tablet (40 mg total) by mouth daily., Disp: 30 tablet, Rfl: 6    Review of Systems   A 12 point comprehensive review of systems was negative except as noted.      Objective:      BP (!) 177/103 (Patient Site: Left Arm, Patient Position: Sitting, Cuff Size: Adult Large)   Pulse 89   Ht 5' 6\" (1.676 m)   Wt 196 lb 12.8 oz (89.3 kg)   Breastfeeding No   BMI 31.76 kg/m      General appearance: alert, appears stated age and cooperative  Head: Normocephalic, without obvious abnormality, atraumatic  Eyes: Conjunctivae clear, sclerae anicteric  Neck: no adenopathy, no carotid bruit, no JVD, supple, symmetrical, trachea midline and thyroid not enlarged, symmetric, no tenderness/mass/nodules  Lungs: clear to auscultation bilaterally  Heart: regular rate and rhythm, " S1, S2 normal, no murmur, click, rub or gallop  Extremities: extremities normal, atraumatic, no cyanosis or edema  Skin: Flushed, clammy, otherwise normal texture and turgor  Neurologic: Grossly normal, alert and oriented x3, no tremor        Results for orders placed or performed in visit on 08/18/20   Comprehensive Metabolic Panel   Result Value Ref Range    Sodium 139 136 - 145 mmol/L    Potassium 4.7 3.5 - 5.0 mmol/L    Chloride 103 98 - 107 mmol/L    CO2 24 22 - 31 mmol/L    Anion Gap, Calculation 12 5 - 18 mmol/L    Glucose 96 70 - 125 mg/dL    BUN 12 8 - 22 mg/dL    Creatinine 0.78 0.60 - 1.10 mg/dL    GFR MDRD Af Amer >60 >60 mL/min/1.73m2    GFR MDRD Non Af Amer >60 >60 mL/min/1.73m2    Bilirubin, Total 0.3 0.0 - 1.0 mg/dL    Calcium 10.0 8.5 - 10.5 mg/dL    Protein, Total 7.0 6.0 - 8.0 g/dL    Albumin 3.8 3.5 - 5.0 g/dL    Alkaline Phosphatase 56 45 - 120 U/L    AST 40 0 - 40 U/L    ALT 77 (H) 0 - 45 U/L   Thyroid Cascade   Result Value Ref Range    TSH 1.94 0.30 - 5.00 uIU/mL   HM2(CBC w/o Differential)   Result Value Ref Range    WBC 5.1 4.0 - 11.0 thou/uL    RBC 5.15 3.80 - 5.40 mill/uL    Hemoglobin 15.7 12.0 - 16.0 g/dL    Hematocrit 48.7 (H) 35.0 - 47.0 %    MCV 95 80 - 100 fL    MCH 30.4 27.0 - 34.0 pg    MCHC 32.2 32.0 - 36.0 g/dL    RDW 11.9 11.0 - 14.5 %    Platelets 272 140 - 440 thou/uL    MPV 8.7 7.0 - 10.0 fL   Follicle Stimulating Hormone (FSH)   Result Value Ref Range    FSH 12.2 mIU/mL

## 2021-06-11 NOTE — PROGRESS NOTES
Follow Up Blood Pressure Check    Minoo Villavicencio is a 50 y.o. female recommended to follow up for blood pressure check by Magalys Sigala MD. Anihypertensive medications and adherence were verified: Yes.     Reason for visit: Elevated BP at last office visit 09/01/20 (BP: 148/92)    Medication change at last visit: Lisinopril-HCTZ increased to 20-25 mg daily    Today's Vitals:   Vitals:    09/15/20 1327 09/15/20 1337   BP: (!) 145/95 139/88   Patient Site: Left Arm Left Arm   Patient Position: Sitting Sitting   Cuff Size: Adult Large Adult Large   Pulse: 95 99       Lowest blood pressure today is less than 140/90 and they deny signs or symptoms of new onset: severe headache, fatigue, confusion, vision changes, chest pain, pounding in the chest, neck, ears, irregular heartbeat, difficulty breathing and blood in the urine.  Please inform patient of his/her blood pressure today.  If they are asymptomatic, the patient is to continue current medications.  This message will be routed to their provider, and they will be notified if a change in medication is recommended.      Current Outpatient Medications   Medication Sig Dispense Refill     famotidine (PEPCID) 40 MG tablet Take 1 tablet (40 mg total) by mouth daily. 30 tablet 6     lisinopriL-hydrochlorothiazide (ZESTORETIC) 20-25 mg per tablet Take 1 tablet by mouth daily. 90 tablet 3     omeprazole (PRILOSEC) 40 MG capsule TAKE ONE CAPSULE BY MOUTH EVERY DAY BEFORE BREAKFAST 90 capsule 1     venlafaxine (EFFEXOR-XR) 150 MG 24 hr capsule TAKE 1 CAPSULE BY MOUTH EVERY DAY 90 capsule 1     No current facility-administered medications for this visit.

## 2021-06-11 NOTE — PROGRESS NOTES
The second blood pressure is in goal range.  I would recommend completing the urine test ordered at the last visit, then update me or Dr. Sigala if symptoms persist despite the lower blood pressure.

## 2021-06-11 NOTE — TELEPHONE ENCOUNTER
RN cannot approve Refill Request    RN can NOT refill this medication Protocol failed and NO refill given. Last office visit: 1/28/2019 Magalys Sigala MD Last Physical: 8/1/2017 Last MTM visit: Visit date not found Last visit same specialty: 9/1/2020 Rody Grigsby MD.  Next visit within 3 mo: Visit date not found  Next physical within 3 mo: Visit date not found      Gisele Porter, [facility] Connection Triage/Med Refill 9/9/2020    Requested Prescriptions   Pending Prescriptions Disp Refills     lisinopriL (PRINIVIL,ZESTRIL) 10 MG tablet [Pharmacy Med Name: LISINOPRIL 10 MG TABLET] 90 tablet 1     Sig: TAKE 1 TABLET BY MOUTH EVERY DAY       Ace Inhibitors Refill Protocol Passed - 9/8/2020 12:44 AM        Passed - PCP or prescribing provider visit in past 12 months       Last office visit with prescriber/PCP: 1/28/2019 Magalys Sigala MD OR same dept: Visit date not found OR same specialty: 9/1/2020 Rody Grigsby MD  Last physical: 8/1/2017 Last MTM visit: Visit date not found   Next visit within 3 mo: Visit date not found  Next physical within 3 mo: Visit date not found  Prescriber OR PCP: Magalys Sigala MD  Last diagnosis associated with med order: 1. HTN (hypertension)  - lisinopriL (PRINIVIL,ZESTRIL) 10 MG tablet [Pharmacy Med Name: LISINOPRIL 10 MG TABLET]; TAKE 1 TABLET BY MOUTH EVERY DAY  Dispense: 90 tablet; Refill: 1    2. Dysthymic disorder  - venlafaxine (EFFEXOR-XR) 150 MG 24 hr capsule [Pharmacy Med Name: VENLAFAXINE HCL  MG CAP]; TAKE 1 CAPSULE BY MOUTH EVERY DAY  Dispense: 90 capsule; Refill: 1    If protocol passes may refill for 12 months if within 3 months of last provider visit (or a total of 15 months).             Passed - Serum Potassium in past 12 months     Lab Results   Component Value Date    Potassium 4.7 08/18/2020             Passed - Blood pressure filed in past 12 months     BP Readings from Last 1 Encounters:   09/01/20 (!) 148/92             Passed - Serum Creatinine in  past 12 months     Creatinine   Date Value Ref Range Status   08/18/2020 0.78 0.60 - 1.10 mg/dL Final                venlafaxine (EFFEXOR-XR) 150 MG 24 hr capsule [Pharmacy Med Name: VENLAFAXINE HCL  MG CAP] 90 capsule 1     Sig: TAKE 1 CAPSULE BY MOUTH EVERY DAY       Venlafaxine/Desvenlafaxine Refill Protocol Failed - 9/8/2020 12:44 AM        Failed - Fasting lipid cascade in last year     Cholesterol   Date Value Ref Range Status   01/28/2019 245 (H) <=199 mg/dL Final     Triglycerides   Date Value Ref Range Status   01/28/2019 84 <=149 mg/dL Final     HDL Cholesterol   Date Value Ref Range Status   01/28/2019 82 >=50 mg/dL Final     LDL Calculated   Date Value Ref Range Status   01/28/2019 146 (H) <=129 mg/dL Final     Patient Fasting > 8hrs?   Date Value Ref Range Status   01/28/2019 Yes  Final             Passed - LFT or AST or ALT in last year     Albumin   Date Value Ref Range Status   08/18/2020 3.8 3.5 - 5.0 g/dL Final     Bilirubin, Total   Date Value Ref Range Status   08/18/2020 0.3 0.0 - 1.0 mg/dL Final     Alkaline Phosphatase   Date Value Ref Range Status   08/18/2020 56 45 - 120 U/L Final     AST   Date Value Ref Range Status   08/18/2020 40 0 - 40 U/L Final     ALT   Date Value Ref Range Status   08/18/2020 77 (H) 0 - 45 U/L Final     Protein, Total   Date Value Ref Range Status   08/18/2020 7.0 6.0 - 8.0 g/dL Final                Passed - PCP or prescribing provider visit in last year     Last office visit with prescriber/PCP: 1/28/2019 Magalys Sigala MD OR same dept: Visit date not found OR same specialty: 9/1/2020 Rody Grigsby MD  Last physical: 8/1/2017 Last MTM visit: Visit date not found   Next visit within 3 mo: Visit date not found  Next physical within 3 mo: Visit date not found  Prescriber OR PCP: Magalys Sigala MD  Last diagnosis associated with med order: 1. HTN (hypertension)  - lisinopriL (PRINIVIL,ZESTRIL) 10 MG tablet [Pharmacy Med Name: LISINOPRIL 10 MG TABLET]; TAKE  1 TABLET BY MOUTH EVERY DAY  Dispense: 90 tablet; Refill: 1    2. Dysthymic disorder  - venlafaxine (EFFEXOR-XR) 150 MG 24 hr capsule [Pharmacy Med Name: VENLAFAXINE HCL  MG CAP]; TAKE 1 CAPSULE BY MOUTH EVERY DAY  Dispense: 90 capsule; Refill: 1    If protocol passes may refill for 12 months if within 3 months of last provider visit (or a total of 15 months).             Passed - Blood Pressure in last year     BP Readings from Last 1 Encounters:   09/01/20 (!) 148/92

## 2021-06-11 NOTE — PROGRESS NOTES
Assessment/Plan:       1. Essential hypertension  Blood pressure is improved, but still slightly higher than goal range.  Recommended increasing lisinopril-hydrochlorothiazide to 20-25 mg.  Patient does not have the ability to check her blood pressure outside the clinic, so will recommend recheck of blood pressure in about 2 weeks.  - Catecholamine Fractionation, Free, 24 Hour Urine; Future  - Metanephrines, Fractionated, Free, Plasma  - lisinopriL-hydrochlorothiazide (ZESTORETIC) 20-25 mg per tablet; Take 1 tablet by mouth daily.  Dispense: 90 tablet; Refill: 3    2. Sensation of feeling hot  This continues, and continues to worsen per patient.  In the exam room today she is flushed and sweating.  She states this is her usual.  She would like further work-up in this regard.  If work-up as below for pheochromocytoma is unrevealing, per up-to-date could consider calcitonin for medullary thyroid cancer or renal ultrasound.  Alternatively, would consider referral to endocrinology for further work-up.  - Catecholamine Fractionation, Free, 24 Hour Urine; Future  - Metanephrines, Fractionated, Free, Plasma    3. Need for immunization against influenza  - Influenza, Seasonal Quad, PF =/> 6months        Subjective:       Minoo Villavicencio is a 50 y.o. female who presents for follow-up of hypertension and a sensation of feeling warm.  She has been tolerating her lisinopril-hydrochlorothiazide well, has not noted any problems with this medication and does not notice increased urination.  She still feels hot and sweaty much of the time.  She has only felt palpitations once in the past 2 weeks and this lasted only seconds.  Previously when her blood pressure was higher she was having palpitations twice weekly or so.  She also notices that in retrospect she felt more short of breath.  She is now able to go for walks and not feeling short of breath.  She has been getting more regular exercise.  She denies any chest pain or  "ankle swelling.  She has always felt \"warm\" to some degree, but this seems worse over the past year.  She never has a temp when she checks, but nearly always feels hot to the point of sweating.  This does not come in flashes.    Labs Reviewed:  Recent Results (from the past 720 hour(s))   Comprehensive Metabolic Panel    Collection Time: 08/18/20  2:21 PM   Result Value Ref Range    Sodium 139 136 - 145 mmol/L    Potassium 4.7 3.5 - 5.0 mmol/L    Chloride 103 98 - 107 mmol/L    CO2 24 22 - 31 mmol/L    Anion Gap, Calculation 12 5 - 18 mmol/L    Glucose 96 70 - 125 mg/dL    BUN 12 8 - 22 mg/dL    Creatinine 0.78 0.60 - 1.10 mg/dL    GFR MDRD Af Amer >60 >60 mL/min/1.73m2    GFR MDRD Non Af Amer >60 >60 mL/min/1.73m2    Bilirubin, Total 0.3 0.0 - 1.0 mg/dL    Calcium 10.0 8.5 - 10.5 mg/dL    Protein, Total 7.0 6.0 - 8.0 g/dL    Albumin 3.8 3.5 - 5.0 g/dL    Alkaline Phosphatase 56 45 - 120 U/L    AST 40 0 - 40 U/L    ALT 77 (H) 0 - 45 U/L   Thyroid Lares    Collection Time: 08/18/20  2:21 PM   Result Value Ref Range    TSH 1.94 0.30 - 5.00 uIU/mL   HM2(CBC w/o Differential)    Collection Time: 08/18/20  2:21 PM   Result Value Ref Range    WBC 5.1 4.0 - 11.0 thou/uL    RBC 5.15 3.80 - 5.40 mill/uL    Hemoglobin 15.7 12.0 - 16.0 g/dL    Hematocrit 48.7 (H) 35.0 - 47.0 %    MCV 95 80 - 100 fL    MCH 30.4 27.0 - 34.0 pg    MCHC 32.2 32.0 - 36.0 g/dL    RDW 11.9 11.0 - 14.5 %    Platelets 272 140 - 440 thou/uL    MPV 8.7 7.0 - 10.0 fL   Follicle Stimulating Hormone (FSH)    Collection Time: 08/18/20  2:21 PM   Result Value Ref Range    FSH 12.2 mIU/mL         The following portions of the patient's history were reviewed and updated as appropriate: allergies, current medications, past medical history, past social history and problem list.      Current Outpatient Medications:      lisinopriL-hydrochlorothiazide (ZESTORETIC) 20-12.5 mg per tablet, Take 1 tablet by mouth daily., Disp: 90 tablet, Rfl: 3     omeprazole " "(PRILOSEC) 40 MG capsule, TAKE ONE CAPSULE BY MOUTH EVERY DAY BEFORE BREAKFAST, Disp: 90 capsule, Rfl: 1     venlafaxine (EFFEXOR-XR) 150 MG 24 hr capsule, TAKE ONE CAPSULE BY MOUTH ONCE DAILY (NEED TO BE SEEN), Disp: 90 capsule, Rfl: 3     famotidine (PEPCID) 40 MG tablet, Take 1 tablet (40 mg total) by mouth daily., Disp: 30 tablet, Rfl: 6    Review of Systems   A 12 point comprehensive review of systems was negative except as noted.      Objective:      BP (!) 148/92 (Patient Site: Left Arm, Patient Position: Sitting, Cuff Size: Adult Large)   Pulse 84   Ht 5' 6\" (1.676 m)   Wt 197 lb 9.6 oz (89.6 kg)   Breastfeeding No   BMI 31.89 kg/m      General appearance: alert, appears stated age and cooperative  Head: Normocephalic, without obvious abnormality, atraumatic  Eyes: Conjunctivae clear, sclerae anicteric  Lungs: clear to auscultation bilaterally  Heart: regular rate and rhythm, S1, S2 normal, no murmur, click, rub or gallop  Extremities: extremities normal, atraumatic, no cyanosis or edema  Skin: Flushed and sweaty throughout  Neurologic: Grossly normal, alert and oriented x3, good historian          "

## 2021-06-11 NOTE — TELEPHONE ENCOUNTER
----- Message from Rody Grigsby MD sent at 9/15/2020 10:19 PM CDT -----      ----- Message -----  From: Sulma Velez LPN  Sent: 9/15/2020   1:37 PM CDT  To: Rody Grigsby MD

## 2021-06-12 NOTE — PROGRESS NOTES
Assessment/ Plan     1. Routine general medical examination at a health care facility  She is up-to-date on her Pap smear, had one in 2016 that was normal.  She is due for her first mammogram and we discussed that today.  We will do fasting blood work.  She is up-to-date on immunizations.  - Comprehensive Metabolic Panel  - HM2(CBC w/o Differential)  - Lipid Cascade  - Thyroid Stimulating Hormone (TSH)  - Vitamin D, Total (25-Hydroxy)    2. Dysthymic disorder  Patient's mood is stable on venlafaxine.  She is doing well with that.     3. Essential hypertension  This is been under better control with just 5 mg of lisinopril.    4. Dysphasia/GERD  Patient is having reflux and occasionally getting food stuck for the last 1 year.  Recommend that she start omeprazole 40 mg once a day.  Discussed lifestyle modification including decrease alcohol, caffeine, chocolate, and other triggers.  If no improvement in symptoms over the next 6 weeks, then would order an EGD to rule out stricture.  - omeprazole (PRILOSEC) 40 MG capsule; Take 1 capsule (40 mg total) by mouth daily before breakfast.  Dispense: 30 capsule; Refill: 6    Subjective:     Minoo Villavicencio is a 46 y.o. female who presents for an annual exam.  Overall, she is doing quite well.  Her only concern today is with some heartburn.  She has been having this several times per week.  She also will get food stuck from time to time.  This happened about once a month over the last 1 year.  It tends to be cold food.  She has a strong family history and that her father, paternal grandfather, and paternal uncle all have had issues with this.  Her dad had to have dilation of strictures on 2 occasions.  She does drink coffee and alcohol.  She is a non-smoker.  She states she really does anti-inflammatories or greasy fatty foods.  As far as healthcare maintenance, she is due for mammogram in order was placed today.  She is up-to-date on immunizations.  We will do fasting blood  work today.  We started her on lisinopril last year for benign essential hypertension.  Her numbers are in good control currently.  She is doing well with her dysthymic disorder with the venlafaxine.  She did have another Mirena IUD placed as she started having heavy bleeding after the last one was removed.  Things are back to normal for her.    Healthy Habits:   Regular Exercise: Yes  Sunscreen Use: Yes  Healthy Diet: Yes  Dental Visits Regularly: Yes  Seat Belt: Yes  Sexually active: Yes  Self Breast Exam Monthly:No  Colonoscopy: No  Lipid Profile: Yes  Glucose Screen: Yes  Prevention of Osteoporosis: No  Last Dexa: No        Immunization History   Administered Date(s) Administered     Tdap 08/13/2012     Immunization status: up to date and documented.     Gynecologic History  No LMP recorded.  Contraception: IUD  Last Pap: 2016. Results were: normal  Last mammogram: due      OB History   No data available       Current Outpatient Prescriptions   Medication Sig Dispense Refill     lisinopril (PRINIVIL,ZESTRIL) 5 MG tablet TAKE ONE TABLET BY MOUTH EVERY DAY 30 tablet 5     venlafaxine (EFFEXOR-XR) 150 MG 24 hr capsule Take 1 capsule (150 mg total) by mouth daily. 90 capsule 0     omeprazole (PRILOSEC) 40 MG capsule Take 1 capsule (40 mg total) by mouth daily before breakfast. 30 capsule 6     No current facility-administered medications for this visit.      No past medical history on file.  No past surgical history on file.  Review of patient's allergies indicates no known allergies.  No family history on file.  Social History     Social History     Marital status:      Spouse name: N/A     Number of children: N/A     Years of education: N/A     Occupational History     Not on file.     Social History Main Topics     Smoking status: Never Smoker     Smokeless tobacco: Not on file     Alcohol use Not on file     Drug use: Not on file     Sexual activity: Not on file     Other Topics Concern     Not on file  "    Social History Narrative       Review of Systems  General:  Denies problems  Eyes:  Denies problems  Ears/Nose/Throat:  Denies problems  Cardiovascular:  Denies problems  Respiratory:  Denies problems  Gastrointestinal:  Denies problems  Genitourinary:  Denies problems  Musculoskeletal:  Denies problems  Skin:  Denies problems  Neurologic:  Denies problems  Psychiatric:  Denies problems  Endocrine:  Denies problems  Heme/Lymphatic:  Denies problems  Allergic/Immunologic:  Denies problems    Objective:      Vitals:    08/01/17 1046   BP: 118/70   Pulse: 80   Resp: 14   Temp: 98.9  F (37.2  C)   TempSrc: Oral   Weight: 166 lb (75.3 kg)   Height: 5' 6\" (1.676 m)       Physical Exam:  General Appearance: Alert, cooperative, no distress, appears stated age  Head: Normocephalic, without obvious abnormality, atraumatic  Eyes: PERRL, conjunctiva/corneas clear, EOM's intact  Ears: Normal TM's and external ear canals, both ears  Nose: Nares normal, septum midline,mucosa normal, no drainage  Throat: Lips, mucosa, and tongue normal; teeth and gums normal  Neck: Supple, symmetrical, trachea midline, no adenopathy; thyroid: not enlarged, symmetric, no tenderness/mass/nodules; no carotid bruit  Back: Symmetric, no curvature, ROM normal, no CVA tenderness  Lungs: Clear to auscultation bilaterally, respirations unlabored  Breasts: No breast masses, tenderness, asymmetry, or nipple discharge  Heart: Regular rate and rhythm, S1 and S2 normal, no murmur, rub, or gallop, Abdomen: Soft, non-tender, bowel sounds active all four quadrants,  no masses, no organomegaly  Extremities: Extremities normal, atraumatic, no cyanosis or edema  Skin: Skin color, texture, turgor normal, no rashes or lesions  Lymph nodes: Cervical, supraclavicular, and axillary nodes normal  Neurologic: Normal        The following high BMI interventions were performed this visit: encouragement to exercise and dietary needs education    Results for orders placed or " performed in visit on 08/01/17   HM2(CBC w/o Differential)   Result Value Ref Range    WBC 3.8 (L) 4.0 - 11.0 thou/uL    RBC 4.82 3.80 - 5.40 mill/uL    Hemoglobin 14.4 12.0 - 16.0 g/dL    Hematocrit 43.6 35.0 - 47.0 %    MCV 91 80 - 100 fL    MCH 29.9 27.0 - 34.0 pg    MCHC 33.0 32.0 - 36.0 g/dL    RDW 11.5 11.0 - 14.5 %    Platelets 246 140 - 440 thou/uL    MPV 8.5 7.0 - 10.0 fL       Magalys Sigala MD    This note has been dictated using voice recognition software. Any grammatical or context distortions are unintentional and inherent to the software

## 2021-06-13 ENCOUNTER — HEALTH MAINTENANCE LETTER (OUTPATIENT)
Age: 51
End: 2021-06-13

## 2021-06-15 NOTE — PATIENT INSTRUCTIONS - HE
Take 75 mg venlafaxine for 1 week, then 1/2 tab for 1 week and then stop.  See how things feel after 2-3 weeks of the medication.  If not change in feeling hot, you could restart if feel you need to. If there is improvement and you want to start something else for mood, let me know.

## 2021-06-15 NOTE — TELEPHONE ENCOUNTER
Incoming call from pt with question about urgent care visit. Was seen over the weekend and diagnosed with sinus infection. She had some swelling in her nose when she went in. Was started on bactrim. She said today the swelling is worse in her nose. Wondering if it is normal for it to get worse after starting antibiotic?     Pt was also requesting refills so I scheduled her with you on Thursday for her physical cause you have not seen her since 2019

## 2021-06-15 NOTE — TELEPHONE ENCOUNTER
Left message to call back for: rx refill  Information to relay to patient:  See note from provider below

## 2021-06-15 NOTE — TELEPHONE ENCOUNTER
I'm not sure what the swelling was as I did not see her and there is no record in chart.  She could contact the urgent care if concerns to talk to the person who saw her.  Otherwise, I can look at it on thursday

## 2021-06-15 NOTE — TELEPHONE ENCOUNTER
Patient states that she doesn't need a refill on the rx. She has plenty. Please disregard this message.

## 2021-06-15 NOTE — PROGRESS NOTES
Assessment/ Plan     1. Routine general medical examination at a health care facility  Maribell presents for her annual exam.  She is due for Pap smear.  She is up-to-date on mammogram.  She is due for colonoscopy next year when she turns 51.  She is up-to-date on immunizations.  She has Mirena IUD in place for menorrhagia.  I cannot see the strings today.  We discussed is not due to come out for an additional 2 years so we will monitor and we check.  At that time if still cannot see strings, will do ultrasound and referral to OB/GYN.  - Gynecologic Cytology (PAP Smear)    2. Essential hypertension  Under good control with her current medication.  - lisinopriL-hydrochlorothiazide (ZESTORETIC) 20-25 mg per tablet; Take 1 tablet by mouth daily.  Dispense: 90 tablet; Refill: 3    3. Dysthymic disorder  She has been on venlafaxine for approximately 16 years.  She is having ongoing sweating and sensation of feeling hot which may be a side effect from the venlafaxine.  She is not sure that she even needs to be on it at this point.  I would recommend that she wean down starting at 75 mg for 1 week and then 37.5 for 1 week.  With then see what happens when she is off the medication for 2 to 3 weeks.  If there is improvement in symptoms but she feels like she needs to restart something for mood, she will contact me through my chart.  If there is no improvement in her symptoms, she can restart the venlafaxine.  - venlafaxine (EFFEXOR) 75 MG tablet; Take 1 tab daily for 7 days, then 1/2 tab daily for 7 days.  Dispense: 14 tablet; Refill: 0    4. Sensation of feeling hot  As above      Subjective:     Minoo Villavicencio is a 50 y.o. female who presents for an annual exam.  Overall, she states she is been doing fairly well.  Her blood pressures been under good control with her current medication.  She was recently seen in urgent care for sinus infection and that is improving.  Her biggest concern is some ongoing feelings of being hot  and sweaty.  She states they are not discrete hot flashes, but she always feels hot.  If she does any movement she starts to sweat.  She was seen by one of my partners in the fall and had normal FSH and metanephrines.  She has IUD in place for menorrhagia which has been helpful.  She has been on venlafaxine for 16 years.  This was started in the postpartum period for dysthymia.  She has not been on it since then.  She not sure that she even needs it at this point.  We discussed that sweating can be a common side effect it may be worth going off of it.    Healthy Habits:   Regular Exercise: Yes  Sunscreen Use: Yes  Healthy Diet: Yes  Dental Visits Regularly: Yes  Seat Belt: Yes  Sexually active: Yes  Self Breast Exam Monthly:No  Colonoscopy: No  Lipid Profile: Yes  Glucose Screen: Yes  Prevention of Osteoporosis: Yes  Last Dexa: No        Immunization History   Administered Date(s) Administered     INFLUENZA,SEASONAL QUAD, PF, =/> 6months 2020     Tdap 2011, 2012     Immunization status: up to date and documented.     Gynecologic History  No LMP recorded (lmp unknown). Patient has had an implant.  Contraception: IUD  Last Pap: . Results were: normal  Last mammogram: . Results were: normal      OB History    Para Term  AB Living   2 2 2         SAB TAB Ectopic Multiple Live Births                  # Outcome Date GA Lbr Jason/2nd Weight Sex Delivery Anes PTL Lv   2 Term            1 Term                Current Outpatient Medications   Medication Sig Dispense Refill     lisinopriL-hydrochlorothiazide (ZESTORETIC) 20-25 mg per tablet Take 1 tablet by mouth daily. 90 tablet 3     omeprazole (PRILOSEC) 40 MG capsule TAKE ONE CAPSULE BY MOUTH EVERY DAY BEFORE BREAKFAST 90 capsule 1     sulfamethoxazole-trimethoprim (SEPTRA DS) 800-160 mg per tablet Take 1 tablet by mouth.       venlafaxine (EFFEXOR-XR) 150 MG 24 hr capsule TAKE 1 CAPSULE BY MOUTH EVERY DAY 90 capsule 1     venlafaxine  (EFFEXOR) 75 MG tablet Take 1 tab daily for 7 days, then 1/2 tab daily for 7 days. 14 tablet 0     No current facility-administered medications for this visit.      No past medical history on file.  No past surgical history on file.  Patient has no known allergies.  Family History   Problem Relation Age of Onset     Stroke Father      Breast cancer Neg Hx      Social History     Socioeconomic History     Marital status:      Spouse name: Not on file     Number of children: Not on file     Years of education: Not on file     Highest education level: Not on file   Occupational History     Not on file   Social Needs     Financial resource strain: Not on file     Food insecurity     Worry: Not on file     Inability: Not on file     Transportation needs     Medical: Not on file     Non-medical: Not on file   Tobacco Use     Smoking status: Never Smoker     Smokeless tobacco: Never Used   Substance and Sexual Activity     Alcohol use: Yes     Alcohol/week: 3.0 standard drinks     Types: 3 Glasses of wine per week     Drug use: Not on file     Sexual activity: Not on file   Lifestyle     Physical activity     Days per week: Not on file     Minutes per session: Not on file     Stress: Not on file   Relationships     Social connections     Talks on phone: Not on file     Gets together: Not on file     Attends [employer] service: Not on file     Active member of club or organization: Not on file     Attends meetings of clubs or organizations: Not on file     Relationship status: Not on file     Intimate partner violence     Fear of current or ex partner: Not on file     Emotionally abused: Not on file     Physically abused: Not on file     Forced sexual activity: Not on file   Other Topics Concern     Not on file   Social History Narrative     Not on file       Review of Systems  General:  Denies problems  Eyes:  Denies problems  Ears/Nose/Throat:  Denies problems  Cardiovascular:  Denies problems  Respiratory:  Denies  "problems  Gastrointestinal:  Denies problems  Genitourinary:  Denies problems  Musculoskeletal:  Denies problems  Skin:  Denies problems  Neurologic:  Denies problems  Psychiatric:  Denies problems  Endocrine:  Denies problems  Heme/Lymphatic:  Denies problems  Allergic/Immunologic:  Denies problems    Objective:      Vitals:    03/04/21 1313 03/04/21 1347   BP: (!) 146/91 134/82   Pulse: 92    Resp: 16    Weight: 198 lb (89.8 kg)    Height: 5' 6\" (1.676 m)        Physical Exam:  General Appearance: Alert, cooperative, no distress, appears stated age  Head: Normocephalic, without obvious abnormality, atraumatic  Eyes: PERRL, conjunctiva/corneas clear, EOM's intact  Ears: Normal TM's and external ear canals, both ears  Nose: Nares normal, septum midline,mucosa normal, no drainage  Throat: Lips, mucosa, and tongue normal; teeth and gums normal  Neck: Supple, symmetrical, trachea midline, no adenopathy; thyroid: not enlarged, symmetric, no tenderness/mass/nodules; no carotid bruit  Back: Symmetric, no curvature, ROM normal, no CVA tenderness  Lungs: Clear to auscultation bilaterally, respirations unlabored  Breasts: No breast masses, tenderness, asymmetry, or nipple discharge  Heart: Regular rate and rhythm, S1 and S2 normal, no murmur, rub, or gallop, Abdomen: Soft, non-tender, bowel sounds active all four quadrants,  no masses, no organomegaly  Pelvic: Normally developed genitalia with no external lesions or eruptions. Vagina and cervix show no lesions, inflammation, discharge or tenderness. Uterus normal to palpation.  No adnexal mass or tenderness.  Extremities: Extremities normal, atraumatic, no cyanosis or edema  Skin: Skin color, texture, turgor normal, no rashes or lesions  Lymph nodes: Cervical, supraclavicular, and axillary nodes normal  Neurologic: Normal      Results for orders placed or performed in visit on 09/01/20   Metanephrines, Fractionated, Free, Plasma   Result Value Ref Range    Normetanephrine " 0.62 0.00 - 0.89 nmol/L    Metanephrine 0.19 0.00 - 0.49 nmol/L    Metanephrines Interpretation See Note        Magalys Sigala MD    This note has been dictated using voice recognition software. Any grammatical or context distortions are unintentional and inherent to the software

## 2021-06-15 NOTE — TELEPHONE ENCOUNTER
RN cannot approve Refill Request    RN can NOT refill this medication Protocol failed and NO refill given. Last office visit: 1/28/2019 Magalys Sigala MD Last Physical: 3/4/2021 Last MTM visit: Visit date not found Last visit same specialty: 9/1/2020 Rody Grigsby MD.  Next visit within 3 mo: Visit date not found  Next physical within 3 mo: Visit date not found      Bruce CARPENTER Frandy, [facility] Triage/Med Refill 3/9/2021    Requested Prescriptions   Pending Prescriptions Disp Refills     venlafaxine (EFFEXOR-XR) 150 MG 24 hr capsule 90 capsule 1     Sig: TAKE 1 CAPSULE BY MOUTH EVERY DAY       Venlafaxine/Desvenlafaxine Refill Protocol Failed - 3/8/2021  2:57 PM        Failed - Fasting lipid cascade in last year     Cholesterol   Date Value Ref Range Status   01/28/2019 245 (H) <=199 mg/dL Final     Triglycerides   Date Value Ref Range Status   01/28/2019 84 <=149 mg/dL Final     HDL Cholesterol   Date Value Ref Range Status   01/28/2019 82 >=50 mg/dL Final     LDL Calculated   Date Value Ref Range Status   01/28/2019 146 (H) <=129 mg/dL Final     Patient Fasting > 8hrs?   Date Value Ref Range Status   01/28/2019 Yes  Final             Passed - LFT or AST or ALT in last year     Albumin   Date Value Ref Range Status   08/18/2020 3.8 3.5 - 5.0 g/dL Final     Bilirubin, Total   Date Value Ref Range Status   08/18/2020 0.3 0.0 - 1.0 mg/dL Final     Alkaline Phosphatase   Date Value Ref Range Status   08/18/2020 56 45 - 120 U/L Final     AST   Date Value Ref Range Status   08/18/2020 40 0 - 40 U/L Final     ALT   Date Value Ref Range Status   08/18/2020 77 (H) 0 - 45 U/L Final     Protein, Total   Date Value Ref Range Status   08/18/2020 7.0 6.0 - 8.0 g/dL Final                Passed - PCP or prescribing provider visit in last year     Last office visit with prescriber/PCP: 1/28/2019 Magalys Sigala MD OR same dept: 9/1/2020 Rody Grigsby MD OR same specialty: 9/1/2020 Rody Grigsby MD  Last  physical: 3/4/2021 Last MTM visit: Visit date not found   Next visit within 3 mo: Visit date not found  Next physical within 3 mo: Visit date not found  Prescriber OR PCP: Magalys Sigala MD  Last diagnosis associated with med order: 1. Dysthymic disorder  - venlafaxine (EFFEXOR-XR) 150 MG 24 hr capsule; TAKE 1 CAPSULE BY MOUTH EVERY DAY  Dispense: 90 capsule; Refill: 1    If protocol passes may refill for 12 months if within 3 months of last provider visit (or a total of 15 months).             Passed - Blood Pressure in last year     BP Readings from Last 1 Encounters:   03/04/21 134/82

## 2021-06-15 NOTE — TELEPHONE ENCOUNTER
Per chart review patient had been weaning down on venlafaxine due to hot flashes.  Venlafaxine refill not provided.  If she would like to go back to her previous dose please let me know and I can send an additional refills.

## 2021-06-16 PROBLEM — I10 ESSENTIAL HYPERTENSION: Status: ACTIVE | Noted: 2017-08-01

## 2021-06-16 PROBLEM — R68.89 SENSATION OF FEELING HOT: Status: ACTIVE | Noted: 2020-08-18

## 2021-06-16 NOTE — TELEPHONE ENCOUNTER
Reason for call:  Patient reporting a symptom    Symptom or request: Patient's spouse, Taqueria called stating patient has been crying, sánchez and stating her ears are buzzing. Per Taqueria, pt stated that she was told to stop taking venlafaxine at her physical couple weeks ago. He stated that it seems like patient is having withdrawals. Taqueria is wondering if Dr. Sigala can reach out to patient tmrw morning and discuss this matter.    Duration (how long have symptoms been present): few days    Have you been treated for this before? No    Additional comments: NA    Phone Number patient can be reached at:    Cell number on file:    Telephone Information:   Mobile [phone]       Best Time:  Anytime    Can we leave a detailed message on this number: Yes    Call taken on 3/24/2021 at 4:27 PM by Trip Washburn

## 2021-06-16 NOTE — TELEPHONE ENCOUNTER
Telephone Encounter by Caryl Inman at 8/27/2019  2:14 PM     Author: Caryl Inman Service: -- Author Type: --    Filed: 8/27/2019  2:15 PM Encounter Date: 8/22/2019 Status: Signed    : Caryl Inman       PRIOR AUTHORIZATION DENIED    Denial Rational: PPI medications are excluded from patient's plan.  She will have to purchase OTC

## 2021-06-19 NOTE — LETTER
Letter by Magalys Sigala MD at      Author: Magalys Sigala MD Service: -- Author Type: --    Filed:  Encounter Date: 6/26/2019 Status: (Other)         Minoo Villavicencio  [id number]  [facility] [address]             June 26, 2019        Dear Minoo Villavicencio :    Dr. Sigala was reviewing your chart and noticed that you are due for a blood pressure follow up.  Your last appointment addressing blood pressure was 1/28/2019.    To prevent further delays in your care and medication refills please schedule a blood pressure follow up through [email or web address], or contact the [facility] at [phone] and we can assist you in scheduling.    If you have transferred care and are no longer a patient of Dr. Sigala's please contact our office so we can update your chart.      Sincerely,  Your care team at [facility]

## 2021-06-19 NOTE — PROGRESS NOTES
"  Chief Complaint   Patient presents with     Mass     Left side         HPI:   Minoo Villavicencio is a 47 y.o. female has had lump on right side of face for the last 3-4 days.  Has enlarged.  Now mildy painful with pressure.  No fever.  Has been a little red.  Has lump on the back of head since yesterday.  No stuffy nose.  No sore throat.  No cough.  No malaise.  Normal appetite.  Used a warm compress.      ROS:  A 10 point comprehensive review of systems was negative except as noted.     Medications:  Current Outpatient Prescriptions on File Prior to Visit   Medication Sig Dispense Refill     lisinopril (PRINIVIL,ZESTRIL) 5 MG tablet TAKE ONE TABLET BY MOUTH EVERY DAY 90 tablet 3     omeprazole (PRILOSEC) 40 MG capsule Take 1 capsule (40 mg total) by mouth daily before breakfast. 30 capsule 6     venlafaxine (EFFEXOR-XR) 150 MG 24 hr capsule TAKE ONE CAPSULE BY MOUTH EVERY DAY 90 capsule 3     No current facility-administered medications on file prior to visit.          Social History:  Social History   Substance Use Topics     Smoking status: Never Smoker     Smokeless tobacco: Never Used     Alcohol use Not on file         Physical Exam:   Vitals:    07/25/18 1255   BP: 146/88   Pulse: 80   Resp: 12   Temp: 98.5  F (36.9  C)   TempSrc: Oral   Weight: 184 lb 6 oz (83.6 kg)   Height: 5' 6\" (1.676 m)       GENERAL:   Alert. Oriented.  EYES: Clear  HENT:  Ears: R TM pearly gray. Normal landmarks. L TM pearly gray.  Normal landmarks  Nose: Clear.  Sinuses: Nontender.  Oropharynx:  No erythema. No exudate.  Scalp:  Small occipital node on left.  Mildly tender.  NECK: Supple. No adenopathy. Small nodule, with overlying erythema and mildly tender just in front of left ear lobe.  No fluctuance  LUNGS: Clear to ascultation.  No crackles.  No wheezing  HEART: RRR  SKIN:  No rash.         Assessment/Plan:    1. Cellulitis  cephalexin 500 mg tablet      Mild cellulitis possibly overlying an infected cyst that is not " fluctuant.  Occipital lymph node.  Keflex as ordered.  Warm moist compresses.  Tylenol if needed.  Recheck if worsening or doesn't clear.          Elijah Pleitez MD      7/25/2018    The following portions of the patient's history were reviewed and updated as appropriate: allergies, current medications, past family history, past medical history, past social history, past surgical history and problem list.

## 2021-06-19 NOTE — LETTER
Letter by Magalys Sigala MD at      Author: Magalys Sigala MD Service: -- Author Type: --    Filed:  Encounter Date: 8/30/2019 Status: (Other)         .bp

## 2021-06-23 NOTE — PROGRESS NOTES
Assessment/ Plan     1. HTN (hypertension)  Her hypertension is not under control with the 5 mg of lisinopril.  Would recommend increasing the dosage to 10 mg.  Would like her to come back for blood pressure only check in about 2 weeks.  She is completely asymptomatic.  She is due for blood work.  - Comprehensive Metabolic Panel  - HM2(CBC w/o Differential)  - lisinopril (PRINIVIL,ZESTRIL) 10 MG tablet; Take 1 tablet (10 mg total) by mouth daily.  Dispense: 90 tablet; Refill: 3    2. Dysphasia  She states the omeprazole has completely resolved her symptoms.  She will continue to take it daily.  - omeprazole (PRILOSEC) 40 MG capsule; TAKE ONE CAPSULE BY MOUTH EVERY DAY BEFORE BREAKFAST  Dispense: 90 capsule; Refill: 3    3. Dysthymic disorder  Patient's mood is under adequate control.  PHQ 9 equals 5.  - venlafaxine (EFFEXOR-XR) 150 MG 24 hr capsule; TAKE ONE CAPSULE BY MOUTH ONCE DAILY  Dispense: 90 capsule; Refill: 2    4. Hypercholesteremia  Patient has had some moderately elevated cholesterol.  She admits she is not very good about her diet or exercise currently.  She plans to try and make some changes.  - Lipid Cascade    5. Encounter for screening mammogram for breast cancer  She is due for mammogram.  Reminded her she is due yearly between 46 and 56.  - Mammo Screening Bilateral; Future      Subjective:       Minoo Villavicencio is a 48 y.o. female who presents for medication refill.  Patient was told she need to follow-up before she had refills of her medications.  She has benign essential hypertension and admits she really has not been checking her blood pressures at all.  She is not having any headaches or any symptoms.  When I saw her last, she was having pretty severe dysphasia.  I started her on omeprazole and she states her symptoms completely resolved.  She has dysthymic disorder and feels like it is under adequate control.  She has had some elevated cholesterol and is fasting today so we will recheck.   "She admits she has gained some weight and has not been good about diet and exercise.  She is also due for mammogram.    Relevant past medical, family, surgical, and social history reviewed with patient, unless noted in HPI, not pertinent for this visit.    Review of Systems   A 12 point comprehensive review of systems was negative except as noted.      Current Outpatient Medications   Medication Sig Dispense Refill     omeprazole (PRILOSEC) 40 MG capsule TAKE ONE CAPSULE BY MOUTH EVERY DAY BEFORE BREAKFAST 90 capsule 3     venlafaxine (EFFEXOR-XR) 150 MG 24 hr capsule TAKE ONE CAPSULE BY MOUTH ONCE DAILY 90 capsule 2     lisinopril (PRINIVIL,ZESTRIL) 10 MG tablet Take 1 tablet (10 mg total) by mouth daily. 90 tablet 3     No current facility-administered medications for this visit.        Objective:      BP (!) 158/94   Pulse 88   Resp 16   Ht 5' 6\" (1.676 m)   Wt 193 lb (87.5 kg)   BMI 31.15 kg/m        General appearance: alert, appears stated age and cooperative  Good eye contact and social smile, speech is normal in fluency and content.  Lungs: clear to auscultation bilaterally  Heart: regular rate and rhythm, S1, S2 normal, no murmur, click, rub or gallop      No results found for this or any previous visit (from the past 168 hour(s)).       This note has been dictated using voice recognition software. Any grammatical or context distortions are unintentional and inherent to the software  "

## 2021-06-23 NOTE — TELEPHONE ENCOUNTER
RN cannot approve Refill Request    RN can NOT refill this medication PCP messaged that patient is overdue for Labs.       Gisele Porter, [facility] Triage/Med Refill 1/22/2019    Requested Prescriptions   Pending Prescriptions Disp Refills     venlafaxine (EFFEXOR-XR) 150 MG 24 hr capsule [Pharmacy Med Name: VENLAFAXINE HCL ER 150MG CP24] 30 capsule 0     Sig: TAKE ONE CAPSULE BY MOUTH ONCE DAILY    Venlafaxine/Desvenlafaxine Refill Protocol Failed - 1/20/2019  5:01 AM       Failed - LFT or AST or ALT in last year    Albumin   Date Value Ref Range Status   08/01/2017 3.6 3.5 - 5.0 g/dL Final     Bilirubin, Total   Date Value Ref Range Status   08/01/2017 0.5 0.0 - 1.0 mg/dL Final     Alkaline Phosphatase   Date Value Ref Range Status   08/01/2017 50 45 - 120 U/L Final     AST   Date Value Ref Range Status   08/01/2017 22 0 - 40 U/L Final     ALT   Date Value Ref Range Status   08/01/2017 22 0 - 45 U/L Final     Protein, Total   Date Value Ref Range Status   08/01/2017 6.7 6.0 - 8.0 g/dL Final               Failed - Fasting lipid cascade in last year    Cholesterol   Date Value Ref Range Status   08/01/2017 247 (H) <=199 mg/dL Final     Triglycerides   Date Value Ref Range Status   08/01/2017 77 <=149 mg/dL Final     HDL Cholesterol   Date Value Ref Range Status   08/01/2017 83 >=50 mg/dL Final     LDL Calculated   Date Value Ref Range Status   08/01/2017 149 (H) <=129 mg/dL Final     Patient Fasting > 8hrs?   Date Value Ref Range Status   08/01/2017 Yes  Final            Passed - PCP or prescribing provider visit in last year    Last office visit with prescriber/PCP: Visit date not found OR same dept: 7/25/2018 Elijah Pleitez MD OR same specialty: 7/25/2018 Elijah Pleitez MD  Last physical: Visit date not found Last MTM visit: Visit date not found   Next visit within 3 mo: Visit date not found  Next physical within 3 mo: Visit date not found  Prescriber OR PCP: Rosario Mercado DO  Last diagnosis  associated with med order: 1. Dysthymic disorder  - venlafaxine (EFFEXOR-XR) 150 MG 24 hr capsule [Pharmacy Med Name: VENLAFAXINE HCL ER 150MG CP24]; TAKE ONE CAPSULE BY MOUTH ONCE DAILY  Dispense: 30 capsule; Refill: 0    2. HTN (hypertension)  - lisinopril (PRINIVIL,ZESTRIL) 5 MG tablet [Pharmacy Med Name: LISINOPRIL 5MG TABS]; TAKE ONE TABLET BY MOUTH ONCE DAILY  Dispense: 30 tablet; Refill: 0    If protocol passes may refill for 12 months if within 3 months of last provider visit (or a total of 15 months).            Passed - Blood Pressure in last year    BP Readings from Last 1 Encounters:   07/25/18 146/88             lisinopril (PRINIVIL,ZESTRIL) 5 MG tablet [Pharmacy Med Name: LISINOPRIL 5MG TABS] 30 tablet 0     Sig: TAKE ONE TABLET BY MOUTH ONCE DAILY    Ace Inhibitors Refill Protocol Failed - 1/20/2019  5:01 AM       Failed - Serum Potassium in past 12 months    No results found for: LN-POTASSIUM         Failed - Serum Creatinine in past 12 months    Creatinine   Date Value Ref Range Status   08/01/2017 0.71 0.60 - 1.10 mg/dL Final            Passed - PCP or prescribing provider visit in past 12 months      Last office visit with prescriber/PCP: Visit date not found OR same dept: 7/25/2018 Elijah Pleitez MD OR same specialty: 7/25/2018 Elijah Pleitez MD  Last physical: Visit date not found Last MT visit: Visit date not found   Next visit within 3 mo: Visit date not found  Next physical within 3 mo: Visit date not found  Prescriber OR PCP: Rosario Mercado DO  Last diagnosis associated with med order: 1. Dysthymic disorder  - venlafaxine (EFFEXOR-XR) 150 MG 24 hr capsule [Pharmacy Med Name: VENLAFAXINE HCL ER 150MG CP24]; TAKE ONE CAPSULE BY MOUTH ONCE DAILY  Dispense: 30 capsule; Refill: 0    2. HTN (hypertension)  - lisinopril (PRINIVIL,ZESTRIL) 5 MG tablet [Pharmacy Med Name: LISINOPRIL 5MG TABS]; TAKE ONE TABLET BY MOUTH ONCE DAILY  Dispense: 30 tablet; Refill: 0    If protocol passes may  refill for 12 months if within 3 months of last provider visit (or a total of 15 months).            Passed - Blood pressure filed in past 12 months    BP Readings from Last 1 Encounters:   07/25/18 146/88

## 2021-06-24 NOTE — TELEPHONE ENCOUNTER
Refill Approved    Rx renewed per Medication Renewal Policy. Medication was last renewed on 1/28/19.    Elva Toth, [facility] Triage/Med Refill 2/23/2019     Requested Prescriptions   Pending Prescriptions Disp Refills     venlafaxine (EFFEXOR-XR) 150 MG 24 hr capsule [Pharmacy Med Name: VENLAFAXINE HCL ER 150MG CP24] 30 capsule 0     Sig: TAKE ONE CAPSULE BY MOUTH ONCE DAILY (NEED TO BE SEEN)    Venlafaxine/Desvenlafaxine Refill Protocol Passed - 2/22/2019  5:01 AM       Passed - LFT or AST or ALT in last year    Albumin   Date Value Ref Range Status   01/28/2019 3.6 3.5 - 5.0 g/dL Final     Bilirubin, Total   Date Value Ref Range Status   01/28/2019 0.3 0.0 - 1.0 mg/dL Final     Alkaline Phosphatase   Date Value Ref Range Status   01/28/2019 51 45 - 120 U/L Final     AST   Date Value Ref Range Status   01/28/2019 43 (H) 0 - 40 U/L Final     ALT   Date Value Ref Range Status   01/28/2019 72 (H) 0 - 45 U/L Final     Protein, Total   Date Value Ref Range Status   01/28/2019 6.6 6.0 - 8.0 g/dL Final               Passed - Fasting lipid cascade in last year    Cholesterol   Date Value Ref Range Status   01/28/2019 245 (H) <=199 mg/dL Final     Triglycerides   Date Value Ref Range Status   01/28/2019 84 <=149 mg/dL Final     HDL Cholesterol   Date Value Ref Range Status   01/28/2019 82 >=50 mg/dL Final     LDL Calculated   Date Value Ref Range Status   01/28/2019 146 (H) <=129 mg/dL Final     Patient Fasting > 8hrs?   Date Value Ref Range Status   01/28/2019 Yes  Final            Passed - PCP or prescribing provider visit in last year    Last office visit with prescriber/PCP: 1/28/2019 Magalys Sigala MD OR same dept: 1/28/2019 Magalys Sigala MD OR same specialty: 1/28/2019 Magalys Sigala MD  Last physical: 8/1/2017 Last MTM visit: Visit date not found   Next visit within 3 mo: Visit date not found  Next physical within 3 mo: Visit date not found  Prescriber OR PCP: Magalys Sigala MD  Last diagnosis  associated with med order: 1. Dysthymic disorder  - venlafaxine (EFFEXOR-XR) 150 MG 24 hr capsule [Pharmacy Med Name: VENLAFAXINE HCL ER 150MG CP24]; TAKE ONE CAPSULE BY MOUTH ONCE DAILY (NEED TO BE SEEN)  Dispense: 30 capsule; Refill: 0    If protocol passes may refill for 12 months if within 3 months of last provider visit (or a total of 15 months).            Passed - Blood Pressure in last year    BP Readings from Last 1 Encounters:   01/28/19 (!) 158/94

## 2021-07-27 ENCOUNTER — OFFICE VISIT (OUTPATIENT)
Dept: FAMILY MEDICINE | Facility: CLINIC | Age: 51
End: 2021-07-27
Payer: COMMERCIAL

## 2021-07-27 VITALS
BODY MASS INDEX: 29.94 KG/M2 | WEIGHT: 186.3 LBS | SYSTOLIC BLOOD PRESSURE: 112 MMHG | TEMPERATURE: 98.8 F | HEART RATE: 82 BPM | DIASTOLIC BLOOD PRESSURE: 86 MMHG | HEIGHT: 66 IN

## 2021-07-27 DIAGNOSIS — Z97.5 IUD (INTRAUTERINE DEVICE) IN PLACE: ICD-10-CM

## 2021-07-27 DIAGNOSIS — F34.1 DYSTHYMIC DISORDER: ICD-10-CM

## 2021-07-27 DIAGNOSIS — Z11.3 SCREEN FOR STD (SEXUALLY TRANSMITTED DISEASE): ICD-10-CM

## 2021-07-27 DIAGNOSIS — Z13.220 SCREENING FOR LIPID DISORDERS: ICD-10-CM

## 2021-07-27 DIAGNOSIS — Z11.59 ENCOUNTER FOR HEPATITIS C SCREENING TEST FOR LOW RISK PATIENT: ICD-10-CM

## 2021-07-27 DIAGNOSIS — I10 ESSENTIAL HYPERTENSION: Primary | ICD-10-CM

## 2021-07-27 LAB
ANION GAP SERPL CALCULATED.3IONS-SCNC: 3 MMOL/L (ref 3–14)
BUN SERPL-MCNC: 19 MG/DL (ref 7–30)
CALCIUM SERPL-MCNC: 9.9 MG/DL (ref 8.5–10.1)
CHLORIDE BLD-SCNC: 106 MMOL/L (ref 94–109)
CHOLEST SERPL-MCNC: 251 MG/DL
CO2 SERPL-SCNC: 30 MMOL/L (ref 20–32)
CREAT SERPL-MCNC: 0.79 MG/DL (ref 0.52–1.04)
FASTING STATUS PATIENT QL REPORTED: YES
GFR SERPL CREATININE-BSD FRML MDRD: 88 ML/MIN/1.73M2
GLUCOSE BLD-MCNC: 115 MG/DL (ref 70–99)
HDLC SERPL-MCNC: 74 MG/DL
LDLC SERPL CALC-MCNC: 163 MG/DL
NONHDLC SERPL-MCNC: 177 MG/DL
POTASSIUM BLD-SCNC: 3.8 MMOL/L (ref 3.4–5.3)
SODIUM SERPL-SCNC: 139 MMOL/L (ref 133–144)
TRIGL SERPL-MCNC: 71 MG/DL

## 2021-07-27 PROCEDURE — 80048 BASIC METABOLIC PNL TOTAL CA: CPT | Performed by: FAMILY MEDICINE

## 2021-07-27 PROCEDURE — 99214 OFFICE O/P EST MOD 30 MIN: CPT | Performed by: FAMILY MEDICINE

## 2021-07-27 PROCEDURE — 86803 HEPATITIS C AB TEST: CPT | Performed by: FAMILY MEDICINE

## 2021-07-27 PROCEDURE — 80061 LIPID PANEL: CPT | Performed by: FAMILY MEDICINE

## 2021-07-27 PROCEDURE — 36415 COLL VENOUS BLD VENIPUNCTURE: CPT | Performed by: FAMILY MEDICINE

## 2021-07-27 PROCEDURE — 87389 HIV-1 AG W/HIV-1&-2 AB AG IA: CPT | Performed by: FAMILY MEDICINE

## 2021-07-27 ASSESSMENT — PATIENT HEALTH QUESTIONNAIRE - PHQ9
SUM OF ALL RESPONSES TO PHQ QUESTIONS 1-9: 3
5. POOR APPETITE OR OVEREATING: NOT AT ALL

## 2021-07-27 ASSESSMENT — ANXIETY QUESTIONNAIRES
2. NOT BEING ABLE TO STOP OR CONTROL WORRYING: NOT AT ALL
5. BEING SO RESTLESS THAT IT IS HARD TO SIT STILL: NOT AT ALL
6. BECOMING EASILY ANNOYED OR IRRITABLE: SEVERAL DAYS
GAD7 TOTAL SCORE: 2
1. FEELING NERVOUS, ANXIOUS, OR ON EDGE: SEVERAL DAYS
7. FEELING AFRAID AS IF SOMETHING AWFUL MIGHT HAPPEN: NOT AT ALL
3. WORRYING TOO MUCH ABOUT DIFFERENT THINGS: NOT AT ALL

## 2021-07-27 ASSESSMENT — MIFFLIN-ST. JEOR: SCORE: 1486.8

## 2021-07-27 NOTE — PROGRESS NOTES
"    Assessment & Plan     Essential hypertension  Discussed with her. She will buy a cuff and document some home numbers before we make any changes to blood pressure meds. We also discussed lifestyle changes that can improve blood pressure The patient indicates understanding of these issues and agrees with the plan.   - Basic metabolic panel  (Ca, Cl, CO2, Creat, Gluc, K, Na, BUN); Future  - Basic metabolic panel  (Ca, Cl, CO2, Creat, Gluc, K, Na, BUN)    IUD (intrauterine device) in place  Ok to leave it in for another year or so     Dysthymic disorder  Stable.no issues   - DEPRESSION ACTION PLAN (DAP)    Screen for STD (sexually transmitted disease)  Discussed   - HIV Antigen Antibody Combo; Future  - HIV Antigen Antibody Combo    Encounter for hepatitis C screening test for low risk patient  Discussed   - Hepatitis C Screen Reflex to HCV RNA Quant and Genotype; Future  - Hepatitis C Screen Reflex to HCV RNA Quant and Genotype    Screening for lipid disorders    - Lipid panel reflex to direct LDL Fasting; Future  - Lipid panel reflex to direct LDL Fasting       BMI:   Estimated body mass index is 30.07 kg/m  as calculated from the following:    Height as of this encounter: 1.676 m (5' 6\").    Weight as of this encounter: 84.5 kg (186 lb 4.8 oz).   Weight management plan: Discussed healthy diet and exercise guidelines    Work on weight loss  Regular exercise    Return in about 3 weeks (around 8/17/2021) for send me a [employer] message.    Toya Licea MD  [facility] LINNEA Hennessy is a 50 year old who presents for the following health issues :    Establish care  - new patient here     Hypertension Follow-up    Currently on lisinopril/hctz 20/25 - has been on it for 2-3 years       Do you check your blood pressure regularly outside of the clinic? No     Are you following a low salt diet? No    Are your blood pressures ever more than 140 on the top number (systolic) OR more   than 90 " "on the bottom number (diastolic), for example 140/90? n/a      How many servings of fruits and vegetables do you eat daily?  2-3    On average, how many sweetened beverages do you drink each day (Examples: soda, juice, sweet tea, etc.  Do NOT count diet or artificially sweetened beverages)?   Rare     How many days per week do you exercise enough to make your heart beat faster? 7    How many minutes a day do you exercise enough to make your heart beat faster? 30 - 60    How many days per week do you miss taking your medication? 0    Blood pressure at the dentist was normal   Then blood pressure check while  was doing colonoscopy was 140s    Was on effexor - Started to have sweating and, after much testing, decided to stop the effexor   Slow taper off and it was difficult with a lot of side effects   Sweating has improved   Mood has been more stable   Doesn't want to restart a medication today     Has an IUD in place - 5 years old   Only using it for period regulation    has vas  Had menometrrhogia prior to placement. Is worried that removing IUD will cause recurrence. She would like to leave it in as she has read that it can continue to work for 6-7 years total.      Review of Systems   Constitutional, HEENT, cardiovascular, pulmonary, gi and gu systems are negative, except as otherwise noted.      Objective    /86   Pulse 82   Temp 98.8  F (37.1  C) (Tympanic)   Ht 1.676 m (5' 6\")   Wt 84.5 kg (186 lb 4.8 oz)   BMI 30.07 kg/m    Body mass index is 30.07 kg/m .   BP Readings from Last 6 Encounters:   07/27/21 112/86   03/04/21 134/82   09/15/20 139/88   09/01/20 (!) 148/92   08/18/20 (!) 177/103     Wt Readings from Last 10 Encounters:   07/27/21 84.5 kg (186 lb 4.8 oz)   03/04/21 89.8 kg (198 lb)   09/01/20 89.6 kg (197 lb 9.6 oz)   08/18/20 89.3 kg (196 lb 12.8 oz)   01/28/19 87.5 kg (193 lb)   07/25/18 83.6 kg (184 lb 6 oz)   08/01/17 75.3 kg (166 lb)   09/07/16 73.2 kg (161 lb 6 oz) "   09/01/16 73 kg (161 lb)   05/26/16 73 kg (161 lb)         Physical Exam   GENERAL: healthy, alert and no distress  EYES: Eyes grossly normal to inspection, PERRL and conjunctivae and sclerae normal  RESP: lungs clear to auscultation - no rales, rhonchi or wheezes  CV: regular rate and rhythm, normal S1 S2, no S3 or S4, no murmur, click or rub, no peripheral edema and peripheral pulses strong  MS: no gross musculoskeletal defects noted, no edema  SKIN: no suspicious lesions or rashes  NEURO: Normal strength and tone, mentation intact and speech normal  PSYCH: mentation appears normal, affect normal/bright

## 2021-07-27 NOTE — LETTER
August 17, 2021      Minoo PAYNE Saud  [address] [facility] [id number]        Dear ,    We are writing to inform you of your test results.     Hi Minoo,   Your hep c test and hiv tests are negative.   Thanks, LARA Licea MD       Resulted Orders   Lipid panel reflex to direct LDL Fasting   Result Value Ref Range    Cholesterol 251 (H) <200 mg/dL      Comment:      Age 0-19 years  Desirable: <170 mg/dL  Borderline high:  170-199 mg/dl  High:            >199 mg/dl    Age 20 years and older  Desirable: <200 mg/dL    Triglycerides 71 <150 mg/dL      Comment:      0-9 years:  Normal:    Less than 75 mg/dL  Borderline high:  75-99 mg/dL  High:             Greater than or equal to 100 mg/dL    0-19 years:  Normal:    Less than 90 mg/dL  Borderline high:   mg/dL  High:             Greater than or equal to 130 mg/dL    20 years and older:  Normal:    Less than 150 mg/dL  Borderline high:  150-199 mg/dL  High:             200-499 mg/dL  Very high:   Greater than or equal to 500 mg/dL    Direct Measure HDL 74 >=50 mg/dL      Comment:      0-19 years:       Greater than or equal to 45 mg/dL   Low: Less than 40 mg/dL   Borderline low: 40-44 mg/dL     20 years and older:   Female: Greater than or equal to 50 mg/dL   Male:   Greater than or equal to 40 mg/dL         LDL Cholesterol Calculated 163 (H) <=100 mg/dL      Comment:      Age 0-19 years:  Desirable: 0-110 mg/dL   Borderline high: 110-129 mg/dL   High: >= 130 mg/dL    Age 20 years and older:  Desirable: <100mg/dL  Above desirable: 100-129 mg/dL   Borderline high: 130-159 mg/dL   High: 160-189 mg/dL   Very high: >= 190 mg/dL    Non HDL Cholesterol 177 (H) <130 mg/dL      Comment:      0-19 years:  Desirable:          Less than 120 mg/dL  Borderline high:   120-144 mg/dL  High:                   Greater than or equal to 145 mg/dL    20 years and older:  Desirable:          130 mg/dL  Above Desirable: 130-159 mg/dL  Borderline high:   160-189 mg/dL  High:                190-219 mg/dL  Very high:     Greater than or equal to 220 mg/dL    Patient Fasting > 8hrs? Yes    Basic metabolic panel  (Ca, Cl, CO2, Creat, Gluc, K, Na, BUN)   Result Value Ref Range    Sodium 139 133 - 144 mmol/L    Potassium 3.8 3.4 - 5.3 mmol/L    Chloride 106 94 - 109 mmol/L    Carbon Dioxide (CO2) 30 20 - 32 mmol/L    Anion Gap 3 3 - 14 mmol/L    Urea Nitrogen 19 7 - 30 mg/dL    Creatinine 0.79 0.52 - 1.04 mg/dL    Calcium 9.9 8.5 - 10.1 mg/dL    Glucose 115 (H) 70 - 99 mg/dL    GFR Estimate 88 >60 mL/min/1.73m2      Comment:      As of July 11, 2021, eGFR is calculated by the CKD-EPI creatinine equation, without race adjustment. eGFR can be influenced by muscle mass, exercise, and diet. The reported eGFR is an estimation only and is only applicable if the renal function is stable.   Hepatitis C Screen Reflex to HCV RNA Quant and Genotype   Result Value Ref Range    Hepatitis C Antibody Nonreactive Nonreactive    Narrative    Assay performance characteristics have not been established for newborns, infants, and children.   HIV Antigen Antibody Combo   Result Value Ref Range    HIV Antigen Antibody Combo Nonreactive Nonreactive      Comment:      HIV-1 p24 Ag & HIV-1/HIV-2 Ab Not Detected       If you have any questions or concerns, please call the clinic at the number listed above.       Sincerely,      Toya Licea MD

## 2021-07-27 NOTE — LETTER
My Depression Action Plan  Name: Minoo Villavicencio   Date of Birth 1970  Date: 7/27/2021    My doctor: Magalys Sigala   My clinic: [facility]  [address] [id number] [phone]          GREEN    ZONE   Good Control    What it looks like:     Things are going generally well. You have normal ups and downs. You may even feel depressed from time to time, but bad moods usually last less than a day.   What you need to do:  1. Continue to care for yourself (see self care plan)  2. Check your depression survival kit and update it as needed  3. Follow your physician s recommendations including any medication.  4. Do not stop taking medication unless you consult with your physician first.           YELLOW         ZONE Getting Worse    What it looks like:     Depression is starting to interfere with your life.     It may be hard to get out of bed; you may be starting to isolate yourself from others.    Symptoms of depression are starting to last most all day and this has happened for several days.     You may have suicidal thoughts but they are not constant.   What you need to do:     1. Call your care team. Your response to treatment will improve if you keep your care team informed of your progress. Yellow periods are signs an adjustment may need to be made.     2. Continue your self-care.  Just get dressed and ready for the day.  Don't give yourself time to talk yourself out of it.    3. Talk to someone in your support network.    4. Open up your Depression Self-Care Plan/Wellness Kit.           RED    ZONE Medical Alert - Get Help    What it looks like:     Depression is seriously interfering with your life.     You may experience these or other symptoms: You can t get out of bed most days, can t work or engage in other necessary activities, you have trouble taking care of basic hygiene, or basic responsibilities, thoughts of suicide or death that will not go away,  self-injurious behavior.     What you need to do:  1. Call your care team and request a same-day appointment. If they are not available (weekends or after hours) call your local crisis line, emergency room or 911.          Depression Self-Care Plan / Wellness Kit    Many people find that medication and therapy are helpful treatments for managing depression. In addition, making small changes to your everyday life can help to boost your mood and improve your wellbeing. Below are some tips for you to consider. Be sure to talk with your medical provider and/or behavioral health consultant if your symptoms are worsening or not improving.     Sleep   Sleep hygiene  means all of the habits that support good, restful sleep. It includes maintaining a consistent bedtime and wake time, using your bedroom only for sleeping or sex, and keeping the bedroom dark and free of distractions like a computer, smartphone, or television.     Develop a Healthy Routine  Maintain good hygiene. Get out of bed in the morning, make your bed, brush your teeth, take a shower, and get dressed. Don t spend too much time viewing media that makes you feel stressed. Find time to relax each day.    Exercise  Get some form of exercise every day. This will help reduce pain and release endorphins, the  feel good  chemicals in your brain. It can be as simple as just going for a walk or doing some gardening, anything that will get you moving.      Diet  Strive to eat healthy foods, including fruits and vegetables. Drink plenty of water. Avoid excessive sugar, caffeine, alcohol, and other mood-altering substances.     Stay Connected with Others  Stay in touch with friends and family members.    Manage Your Mood  Try deep breathing, massage therapy, biofeedback, or meditation. Take part in fun activities when you can. Try to find something to smile about each day.     Psychotherapy  Be open to working with a therapist if your provider recommends it.      Medication  Be sure to take your medication as prescribed. Most anti-depressants need to be taken every day. It usually takes several weeks for medications to work. Not all medicines work for all people. It is important to follow-up with your provider to make sure you have a treatment plan that is working for you. Do not stop your medication abruptly without first discussing it with your provider.    Crisis Resources   These hotlines are for both adults and children. They and are open 24 hours a day, 7 days a week unless noted otherwise.      National Suicide Prevention Lifeline   8-452-186-TALK (3645)      Crisis Text Line    www.crisistextline.org  Text HOME to 227620 from anywhere in the [place], anytime, about any type of crisis. A live, trained crisis counselor will receive the text and respond quickly.      [place] Lifeline for LGBTQ Youth  A national crisis intervention and suicide lifeline for LGBTQ youth under 25. Provides a safe place to talk without judgement. Call 1-847.472.5340; text START to 612424 or visit www.thetrevorproject.org to talk to a trained counselor.      For [facility] crisis numbers, visit the [facility] website at:  https://[place].gov/dhs/people-we-serve/adults/health-care/mental-health/resources/crisis-contacts.jsp

## 2021-07-28 LAB
HCV AB SERPL QL IA: NONREACTIVE
HIV 1+2 AB+HIV1 P24 AG SERPL QL IA: NONREACTIVE

## 2021-07-28 ASSESSMENT — ANXIETY QUESTIONNAIRES: GAD7 TOTAL SCORE: 2

## 2021-10-04 ENCOUNTER — HEALTH MAINTENANCE LETTER (OUTPATIENT)
Age: 51
End: 2021-10-04

## 2021-11-28 ENCOUNTER — HEALTH MAINTENANCE LETTER (OUTPATIENT)
Age: 51
End: 2021-11-28

## 2022-01-07 ENCOUNTER — OFFICE VISIT (OUTPATIENT)
Dept: FAMILY MEDICINE | Facility: CLINIC | Age: 52
End: 2022-01-07
Payer: COMMERCIAL

## 2022-01-07 VITALS
TEMPERATURE: 99.1 F | SYSTOLIC BLOOD PRESSURE: 126 MMHG | HEART RATE: 80 BPM | OXYGEN SATURATION: 97 % | DIASTOLIC BLOOD PRESSURE: 76 MMHG

## 2022-01-07 DIAGNOSIS — R05.9 COUGH: ICD-10-CM

## 2022-01-07 DIAGNOSIS — I10 ESSENTIAL HYPERTENSION: ICD-10-CM

## 2022-01-07 DIAGNOSIS — Z12.31 VISIT FOR SCREENING MAMMOGRAM: ICD-10-CM

## 2022-01-07 DIAGNOSIS — R07.9 CHEST PAIN, UNSPECIFIED TYPE: Primary | ICD-10-CM

## 2022-01-07 DIAGNOSIS — Z12.11 SPECIAL SCREENING FOR MALIGNANT NEOPLASMS, COLON: ICD-10-CM

## 2022-01-07 PROCEDURE — 99214 OFFICE O/P EST MOD 30 MIN: CPT | Performed by: FAMILY MEDICINE

## 2022-01-07 RX ORDER — LOSARTAN POTASSIUM 50 MG/1
50 TABLET ORAL DAILY
Qty: 90 TABLET | Refills: 1 | Status: SHIPPED | OUTPATIENT
Start: 2022-01-07 | End: 2022-05-31

## 2022-01-07 RX ORDER — HYDROCHLOROTHIAZIDE 25 MG/1
25 TABLET ORAL DAILY
Qty: 90 TABLET | Refills: 1 | Status: SHIPPED | OUTPATIENT
Start: 2022-01-07 | End: 2022-05-31

## 2022-01-07 NOTE — PROGRESS NOTES
Assessment & Plan     Chest pain, unspecified type  I'm concerned about her symptoms of shortness of breath and chest tightness with exercise. Could be just deconditioning but I would like to check stress echo to be sure. Discussed with her and she agrees.   - Echocardiogram Exercise Stress; Future    (R05.9) Cough  Comment: will switch her off the ace and onto an arb. Continue hydrochlorothiazide at 25 daily. Check labs in 2 weeks.  she can record BP outside clinic. If blood pressure is >140/90 on several readings, needs to return to clinic to discuss.     Plan:     (I10) Essential hypertension  Comment: labs in two weeks. Med change to cozaar 50 and hydrochlorothiazide 25   Plan: losartan (COZAAR) 50 MG tablet,         hydrochlorothiazide (HYDRODIURIL) 25 MG tablet,        Echocardiogram Exercise Stress, Basic metabolic        panel  (Ca, Cl, CO2, Creat, Gluc, K, Na, BUN)            (Z12.31) Visit for screening mammogram  Comment: discussed   Plan: MA Screen Bilateral w/Tip            (Z12.11) Special screening for malignant neoplasms, colon  Comment: discussed   Plan: Adult Gastro Ref - Procedure Only               Work on weight loss  Regular exercise    Return in about 2 weeks (around 1/21/2022) for Lab Work.    Toya Licea MD  [facility] LINNEA Hennessy is a 51 year old who presents for the following health issues    Acute Illness  Acute illness concerns:   Onset/Duration: mid November   Symptoms:  Fever: no  Chills/Sweats: no  Headache (location?): no  Sinus Pressure: no  Conjunctivitis:  no  Ear Pain: no  Rhinorrhea: YES  Congestion: chest feels heavy   Sore Throat: no  Cough: YES- periodically dry cough   Wheeze: no  Decreased Appetite: no  Nausea: no  Vomiting: no  Diarrhea: no  Dysuria/Freq.: no  Dysuria or Hematuria: no  Fatigue/Achiness: no  Sick/Strep Exposure: no  Therapies tried and outcome:  Nasal spray - fluticasone - helping a little     Last seen in clinic in  7/2021 for hypertension   Currently on lisinopril/hctz 20/25    Some chest heaviness when going up stairs or walking her dog     Says she is out of shape - no regular execise     Went to urgent care last week for the cough   EXAM: XR CHEST 2 VIEWS PA AND LATERAL   [facility]ATION: [facility]   DATE/TIME: 12/29/2021 12:40 PM   INDICATION: Cough   COMPARISON: None.   IMPRESSION: Negative chest.        Review of Systems   Constitutional, HEENT, cardiovascular, pulmonary, gi and gu systems are negative, except as otherwise noted.      Objective    /76   Pulse 80   Temp 99.1  F (37.3  C) (Tympanic)   SpO2 97%   There is no height or weight on file to calculate BMI.  Physical Exam   GENERAL: healthy, alert and no distress  EYES: Eyes grossly normal to inspection, PERRL and conjunctivae and sclerae normal  HENT: ear canals and TM's normal, nose and mouth without ulcers or lesions  NECK: no adenopathy, no asymmetry, masses, or scars and thyroid normal to palpation  RESP: lungs clear to auscultation - no rales, rhonchi or wheezes  CV: regular rate and rhythm, normal S1 S2, no S3 or S4, no murmur, click or rub, no peripheral edema and peripheral pulses strong  ABDOMEN: soft, nontender, no hepatosplenomegaly, no masses and bowel sounds normal  MS: no gross musculoskeletal defects noted, no edema  NEURO: Normal strength and tone, mentation intact and speech normal  PSYCH: mentation appears normal, affect normal/bright

## 2022-01-21 ENCOUNTER — LAB (OUTPATIENT)
Dept: LAB | Facility: CLINIC | Age: 52
End: 2022-01-21
Payer: COMMERCIAL

## 2022-01-21 DIAGNOSIS — I10 ESSENTIAL HYPERTENSION: ICD-10-CM

## 2022-01-21 LAB
ANION GAP SERPL CALCULATED.3IONS-SCNC: 4 MMOL/L (ref 3–14)
BUN SERPL-MCNC: 17 MG/DL (ref 7–30)
CALCIUM SERPL-MCNC: 9.4 MG/DL (ref 8.5–10.1)
CHLORIDE BLD-SCNC: 104 MMOL/L (ref 94–109)
CO2 SERPL-SCNC: 28 MMOL/L (ref 20–32)
CREAT SERPL-MCNC: 0.78 MG/DL (ref 0.52–1.04)
GFR SERPL CREATININE-BSD FRML MDRD: >90 ML/MIN/1.73M2
GLUCOSE BLD-MCNC: 101 MG/DL (ref 70–99)
POTASSIUM BLD-SCNC: 3.5 MMOL/L (ref 3.4–5.3)
SODIUM SERPL-SCNC: 136 MMOL/L (ref 133–144)

## 2022-01-21 PROCEDURE — 36415 COLL VENOUS BLD VENIPUNCTURE: CPT

## 2022-01-21 PROCEDURE — 80048 BASIC METABOLIC PNL TOTAL CA: CPT

## 2022-02-17 ENCOUNTER — TELEPHONE (OUTPATIENT)
Dept: FAMILY MEDICINE | Facility: CLINIC | Age: 52
End: 2022-02-17

## 2022-02-17 ENCOUNTER — HOSPITAL ENCOUNTER (OUTPATIENT)
Dept: CARDIOLOGY | Facility: CLINIC | Age: 52
Discharge: HOME | End: 2022-02-17
Attending: FAMILY MEDICINE | Admitting: FAMILY MEDICINE
Payer: COMMERCIAL

## 2022-02-17 DIAGNOSIS — R94.39 ABNORMAL STRESS TEST: Primary | ICD-10-CM

## 2022-02-17 DIAGNOSIS — I10 ESSENTIAL HYPERTENSION: ICD-10-CM

## 2022-02-17 DIAGNOSIS — R07.9 CHEST PAIN, UNSPECIFIED TYPE: ICD-10-CM

## 2022-02-17 PROCEDURE — 93017 CV STRESS TEST TRACING ONLY: CPT

## 2022-02-17 PROCEDURE — 93325 DOPPLER ECHO COLOR FLOW MAPG: CPT | Mod: TC

## 2022-02-17 PROCEDURE — 255N000002 HC RX 255 OP 636: Performed by: FAMILY MEDICINE

## 2022-02-17 PROCEDURE — 93018 CV STRESS TEST I&R ONLY: CPT | Performed by: INTERNAL MEDICINE

## 2022-02-17 PROCEDURE — C8928 TTE W OR W/O FOL W/CON,STRES: HCPCS

## 2022-02-17 PROCEDURE — 93016 CV STRESS TEST SUPVJ ONLY: CPT | Performed by: INTERNAL MEDICINE

## 2022-02-17 PROCEDURE — 93350 STRESS TTE ONLY: CPT | Mod: 26 | Performed by: INTERNAL MEDICINE

## 2022-02-17 PROCEDURE — 93325 DOPPLER ECHO COLOR FLOW MAPG: CPT | Mod: 26 | Performed by: INTERNAL MEDICINE

## 2022-02-17 PROCEDURE — 93321 DOPPLER ECHO F-UP/LMTD STD: CPT | Mod: 26 | Performed by: INTERNAL MEDICINE

## 2022-02-17 RX ADMIN — HUMAN ALBUMIN MICROSPHERES AND PERFLUTREN 9 ML: 10; .22 INJECTION, SOLUTION INTRAVENOUS at 08:17

## 2022-02-17 NOTE — TELEPHONE ENCOUNTER
Rupesh Suarez Christine E, MD; Michael Johnson MD; [address]     Can you please place an order per Dr. Johnson's request for a complete echo to evaluate for a pericardial effusion, trixie as asap?     Thank you!!   Rupesh                Previous Messages       ----- Message -----   From: Michael Johnson MD   Sent: 2/17/2022  11:56 AM CST   To: Rupesh Burch     Patient had a stress test. I am not sure if there is a pericardial effusion on apical images towards the end. We should get the patient back in soon for another full echo asap. Thanks, K

## 2022-03-17 ENCOUNTER — HOSPITAL ENCOUNTER (OUTPATIENT)
Dept: CARDIOLOGY | Facility: CLINIC | Age: 52
Discharge: HOME | End: 2022-03-17
Attending: FAMILY MEDICINE | Admitting: FAMILY MEDICINE
Payer: COMMERCIAL

## 2022-03-17 DIAGNOSIS — R94.39 ABNORMAL STRESS TEST: ICD-10-CM

## 2022-03-17 LAB — LVEF ECHO: NORMAL

## 2022-03-17 PROCEDURE — 93306 TTE W/DOPPLER COMPLETE: CPT

## 2022-03-17 PROCEDURE — 93306 TTE W/DOPPLER COMPLETE: CPT | Mod: 26 | Performed by: INTERNAL MEDICINE

## 2022-04-26 ENCOUNTER — VIRTUAL VISIT (OUTPATIENT)
Dept: FAMILY MEDICINE | Facility: CLINIC | Age: 52
End: 2022-04-26
Payer: COMMERCIAL

## 2022-04-26 DIAGNOSIS — F33.1 MAJOR DEPRESSIVE DISORDER, RECURRENT EPISODE, MODERATE (H): Primary | ICD-10-CM

## 2022-04-26 PROBLEM — R68.89 SENSATION OF FEELING HOT: Status: RESOLVED | Noted: 2020-08-18 | Resolved: 2022-04-26

## 2022-04-26 PROCEDURE — 99214 OFFICE O/P EST MOD 30 MIN: CPT | Mod: 95 | Performed by: FAMILY MEDICINE

## 2022-04-26 ASSESSMENT — ANXIETY QUESTIONNAIRES
4. TROUBLE RELAXING: MORE THAN HALF THE DAYS
3. WORRYING TOO MUCH ABOUT DIFFERENT THINGS: NEARLY EVERY DAY
6. BECOMING EASILY ANNOYED OR IRRITABLE: MORE THAN HALF THE DAYS
5. BEING SO RESTLESS THAT IT IS HARD TO SIT STILL: SEVERAL DAYS
7. FEELING AFRAID AS IF SOMETHING AWFUL MIGHT HAPPEN: SEVERAL DAYS
GAD7 TOTAL SCORE: 15
2. NOT BEING ABLE TO STOP OR CONTROL WORRYING: NEARLY EVERY DAY
7. FEELING AFRAID AS IF SOMETHING AWFUL MIGHT HAPPEN: SEVERAL DAYS
1. FEELING NERVOUS, ANXIOUS, OR ON EDGE: NEARLY EVERY DAY

## 2022-04-26 ASSESSMENT — PATIENT HEALTH QUESTIONNAIRE - PHQ9
10. IF YOU CHECKED OFF ANY PROBLEMS, HOW DIFFICULT HAVE THESE PROBLEMS MADE IT FOR YOU TO DO YOUR WORK, TAKE CARE OF THINGS AT HOME, OR GET ALONG WITH OTHER PEOPLE: SOMEWHAT DIFFICULT
SUM OF ALL RESPONSES TO PHQ QUESTIONS 1-9: 23
SUM OF ALL RESPONSES TO PHQ QUESTIONS 1-9: 23

## 2022-04-26 ASSESSMENT — COLUMBIA-SUICIDE SEVERITY RATING SCALE - C-SSRS
1. WITHIN THE PAST MONTH, HAVE YOU WISHED YOU WERE DEAD OR WISHED YOU COULD GO TO SLEEP AND NOT WAKE UP?: NO
2. IN THE PAST MONTH, HAVE YOU ACTUALLY HAD ANY THOUGHTS OF KILLING YOURSELF?: NO
6. HAVE YOU EVER DONE ANYTHING, STARTED TO DO ANYTHING, OR PREPARED TO DO ANYTHING TO END YOUR LIFE?: NO

## 2022-04-26 NOTE — PROGRESS NOTES
Answers for HPI/ROS submitted by the patient on 4/26/2022  If you checked off any problems, how difficult have these problems made it for you to do your work, take care of things at home, or get along with other people?: Somewhat difficult  PHQ9 TOTAL SCORE: 23  ASHISH 7 TOTAL SCORE: 15  Depression/Anxiety: Depression & Anxiety  Status since last visit:: bad  Anxiety since last: : bad  Other associated symptoms of depression:: Yes  Other associated symotome: : Yes  Significant life event: : job concerns, financial concerns, other  Anxious:: Yes  Current substance use:: No  How many servings of fruits and vegetables do you eat daily?: 2-3  On average, how many sweetened beverages do you drink each day (Examples: soda, juice, sweet tea, etc.  Do NOT count diet or artificially sweetened beverages)?: 0  How many minutes a day do you exercise enough to make your heart beat faster?: 20 to 29  How many days a week do you exercise enough to make your heart beat faster?: 5  How many days per week do you miss taking your medication?: 0    Minoo is a 51 year old who is being evaluated via a billable video visit.      How would you like to obtain your AVS? MyChart  If the video visit is dropped, the invitation should be resent by: Text to cell phone: [phone]  Will anyone else be joining your video visit? No    Video Start Time: 5:13 PM    Assessment & Plan     Major depressive disorder, recurrent episode, moderate (H)  Will start zoloft and taper up to 100mg/day. Reconnect with her therapist. F/u in one month or sooner prn. The patient indicates understanding of these issues and agrees with the plan.   - sertraline (ZOLOFT) 50 MG tablet; Take 2 tablets (100 mg) by mouth daily - taper up 1/2 tablet (25 mg) for 3-5 days, then increase to 1 tablet ( 50mg) for 3-5 days, then increase to 1.5 tablets ( 75mg) for 3-5 days, then increase to 2 tablets ( 100mg) and stay at this dose       Depression Screening Follow Up    PHQ  4/26/2022   PHQ-9 Total Score 23   Q9: Thoughts of better off dead/self-harm past 2 weeks More than half the days   F/U: Thoughts of suicide or self-harm Yes   F/U: Self harm-plan No   F/U: Self-harm action No   F/U: Safety concerns No     Last PHQ-9 4/26/2022   1.  Little interest or pleasure in doing things 3   2.  Feeling down, depressed, or hopeless 3   3.  Trouble falling or staying asleep, or sleeping too much 3   4.  Feeling tired or having little energy 3   5.  Poor appetite or overeating 3   6.  Feeling bad about yourself 3   7.  Trouble concentrating 3   8.  Moving slowly or restless 0   Q9: Thoughts of better off dead/self-harm past 2 weeks 2   PHQ-9 Total Score 23   Difficulty at work, home, or with people -   In the past two weeks have you had thoughts of suicide or self harm? Yes   Do you have concerns about your personal safety or the safety of others? No   In the past 2 weeks have you thought about a plan or had intention to harm yourself? No   In the past 2 weeks have you acted on these thoughts in any way? No         C-SSRS (Brief [place]) 4/26/2022   Within the last month, have you wished you were dead or wished you could go to sleep and not wake up? No   Within the last month, have you had any actual thoughts of killing yourself? No   Within the last month, have you ever done anything, started to do anything, or prepared to do anything to end your life? No         Follow Up     Follow Up Actions Taken  Crisis resource information provided in the After Visit Summary  Patient declined referral.    Discussed the following ways the patient can remain in a safe environment:  be around others  Regular exercise    No follow-ups on file.    Toya Licea MD  [facility] LINNEA Hennessy is a 51 year old who presents for the following health issues    Mood has been down  Sad, tearful  Was taken off the effexor last year due to sweating   This med worked somewhat for her in  "the past  She hasn't ever tried any other meds for mood     She is really low  High anxiety too due to life stressors   Suicidal thoughts but no plan. Would \"never\" act on it.   Has kids, planning college and grad party     Says that she \"knows what this is\" and this is depression     Has done counseling in the past   Says she knows tools/tricks  Does have a provider she can reconnect with           Review of Systems   Constitutional, HEENT, cardiovascular, pulmonary, gi and gu systems are negative, except as otherwise noted.      Objective        Vitals:  No vitals were obtained today due to virtual visit.    Physical Exam   GENERAL: Healthy, alert. Tearful multiple times throughout our conversation   EYES: Eyes grossly normal to inspection.  No discharge or erythema, or obvious scleral/conjunctival abnormalities.  RESP: No audible wheeze, cough, or visible cyanosis.  No visible retractions or increased work of breathing.    SKIN: Visible skin clear. No significant rash, abnormal pigmentation or lesions.  NEURO: Cranial nerves grossly intact.  Mentation and speech appropriate for age.  PSYCH: Mentation appears normal, affect normal/bright, judgement and insight intact, normal speech and appearance well-groomed.            Video-Visit Details    Type of service:  Video Visit    Video End Time:5:31 PM    Originating Location (pt. Location): Home    Distant Location (provider location):  [facility]     Platform used for Video Visit: Berenice"

## 2022-04-26 NOTE — PATIENT INSTRUCTIONS
Zoloft taper up :    Take 1/2 tablet (25 mg) for 3-5 days, then increase to 1 tablet ( 50mg) for 3-5 days, then increase to 1.5 tablets ( 75mg) for 3-5 days, then increase to 2 tablets ( 100mg) and stay at this dose      Crisis Intervention: [phone] or [phone] (TTY: [phone]). Call anytime for help.   National [place] on Mental Illness (www.mn.анна.org): 356.870.7119 or 456-626-2664.   Suicide Awareness Voices of Education (SAVE) (www.save.org): 333-024-PKTO (8692)   National Suicide Prevention Line (www.mentalhealthmn.org): 723-430-RMYB (2778)

## 2022-04-27 ASSESSMENT — PATIENT HEALTH QUESTIONNAIRE - PHQ9: SUM OF ALL RESPONSES TO PHQ QUESTIONS 1-9: 23

## 2022-04-27 ASSESSMENT — ANXIETY QUESTIONNAIRES: GAD7 TOTAL SCORE: 15

## 2022-05-15 ENCOUNTER — HEALTH MAINTENANCE LETTER (OUTPATIENT)
Age: 52
End: 2022-05-15

## 2022-05-31 ENCOUNTER — OFFICE VISIT (OUTPATIENT)
Dept: FAMILY MEDICINE | Facility: CLINIC | Age: 52
End: 2022-05-31
Payer: COMMERCIAL

## 2022-05-31 VITALS
WEIGHT: 179.1 LBS | SYSTOLIC BLOOD PRESSURE: 136 MMHG | HEIGHT: 67 IN | TEMPERATURE: 98.8 F | DIASTOLIC BLOOD PRESSURE: 82 MMHG | BODY MASS INDEX: 28.11 KG/M2 | HEART RATE: 74 BPM

## 2022-05-31 DIAGNOSIS — Z13.220 LIPID SCREENING: ICD-10-CM

## 2022-05-31 DIAGNOSIS — F33.1 MAJOR DEPRESSIVE DISORDER, RECURRENT EPISODE, MODERATE (H): ICD-10-CM

## 2022-05-31 DIAGNOSIS — Z23 NEED FOR VACCINATION: ICD-10-CM

## 2022-05-31 DIAGNOSIS — I10 ESSENTIAL HYPERTENSION: Primary | ICD-10-CM

## 2022-05-31 PROBLEM — R05.9 COUGH: Status: RESOLVED | Noted: 2022-01-07 | Resolved: 2022-05-31

## 2022-05-31 LAB
ANION GAP SERPL CALCULATED.3IONS-SCNC: 8 MMOL/L (ref 3–14)
BUN SERPL-MCNC: 17 MG/DL (ref 7–30)
CALCIUM SERPL-MCNC: 9.7 MG/DL (ref 8.5–10.1)
CHLORIDE BLD-SCNC: 106 MMOL/L (ref 94–109)
CHOLEST SERPL-MCNC: 225 MG/DL
CO2 SERPL-SCNC: 29 MMOL/L (ref 20–32)
CREAT SERPL-MCNC: 0.73 MG/DL (ref 0.52–1.04)
FASTING STATUS PATIENT QL REPORTED: YES
GFR SERPL CREATININE-BSD FRML MDRD: >90 ML/MIN/1.73M2
GLUCOSE BLD-MCNC: 102 MG/DL (ref 70–99)
HDLC SERPL-MCNC: 62 MG/DL
LDLC SERPL CALC-MCNC: 138 MG/DL
NONHDLC SERPL-MCNC: 163 MG/DL
POTASSIUM BLD-SCNC: 3.6 MMOL/L (ref 3.4–5.3)
SODIUM SERPL-SCNC: 143 MMOL/L (ref 133–144)
TRIGL SERPL-MCNC: 126 MG/DL

## 2022-05-31 PROCEDURE — 91305 COVID-19,PF,PFIZER (12+ YRS): CPT | Performed by: FAMILY MEDICINE

## 2022-05-31 PROCEDURE — 90750 HZV VACC RECOMBINANT IM: CPT | Performed by: FAMILY MEDICINE

## 2022-05-31 PROCEDURE — 80048 BASIC METABOLIC PNL TOTAL CA: CPT | Performed by: FAMILY MEDICINE

## 2022-05-31 PROCEDURE — 90471 IMMUNIZATION ADMIN: CPT | Performed by: FAMILY MEDICINE

## 2022-05-31 PROCEDURE — 80061 LIPID PANEL: CPT | Performed by: FAMILY MEDICINE

## 2022-05-31 PROCEDURE — 0054A COVID-19,PF,PFIZER (12+ YRS): CPT | Performed by: FAMILY MEDICINE

## 2022-05-31 PROCEDURE — 99214 OFFICE O/P EST MOD 30 MIN: CPT | Mod: 25 | Performed by: FAMILY MEDICINE

## 2022-05-31 PROCEDURE — 36415 COLL VENOUS BLD VENIPUNCTURE: CPT | Performed by: FAMILY MEDICINE

## 2022-05-31 RX ORDER — LOSARTAN POTASSIUM 50 MG/1
100 TABLET ORAL DAILY
Qty: 180 TABLET | Refills: 1 | Status: SHIPPED | OUTPATIENT
Start: 2022-05-31 | End: 2022-11-23

## 2022-05-31 RX ORDER — SERTRALINE HYDROCHLORIDE 100 MG/1
100 TABLET, FILM COATED ORAL DAILY
Qty: 90 TABLET | Refills: 1 | Status: SHIPPED | OUTPATIENT
Start: 2022-05-31 | End: 2022-11-23

## 2022-05-31 RX ORDER — HYDROCHLOROTHIAZIDE 25 MG/1
25 TABLET ORAL DAILY
Qty: 90 TABLET | Refills: 1 | Status: SHIPPED | OUTPATIENT
Start: 2022-05-31 | End: 2023-01-07

## 2022-05-31 ASSESSMENT — PATIENT HEALTH QUESTIONNAIRE - PHQ9
SUM OF ALL RESPONSES TO PHQ QUESTIONS 1-9: 5
10. IF YOU CHECKED OFF ANY PROBLEMS, HOW DIFFICULT HAVE THESE PROBLEMS MADE IT FOR YOU TO DO YOUR WORK, TAKE CARE OF THINGS AT HOME, OR GET ALONG WITH OTHER PEOPLE: NOT DIFFICULT AT ALL
SUM OF ALL RESPONSES TO PHQ QUESTIONS 1-9: 5

## 2022-05-31 NOTE — NURSING NOTE
Prior to immunization administration, verified patients identity using patient s name and date of birth. Please see Immunization Activity for additional information.     Screening Questionnaire for Adult Immunization    Are you sick today?   No   Do you have allergies to medications, food, a vaccine component or latex?   No   Have you ever had a serious reaction after receiving a vaccination?   No   Do you have a long-term health problem with heart, lung, kidney, or metabolic disease (e.g., diabetes), asthma, a blood disorder, no spleen, complement component deficiency, a cochlear implant, or a spinal fluid leak?  Are you on long-term aspirin therapy?   No   Do you have cancer, leukemia, HIV/AIDS, or any other immune system problem?   No   Do you have a parent, brother, or sister with an immune system problem?   No   In the past 3 months, have you taken medications that affect  your immune system, such as prednisone, other steroids, or anticancer drugs; drugs for the treatment of rheumatoid arthritis, Crohn s disease, or psoriasis; or have you had radiation treatments?   No   Have you had a seizure, or a brain or other nervous system problem?   No   During the past year, have you received a transfusion of blood or blood    products, or been given immune (gamma) globulin or antiviral drug?   No   For women: Are you pregnant or is there a chance you could become       pregnant during the next month?   No   Have you received any vaccinations in the past 4 weeks?   No     Immunization questionnaire answers were all negative.        Per orders of Dr. Toya Licea, injection of Covid 2nd booster and shingrix given by Alia Parra LPN. Patient instructed to remain in clinic for 15 minutes afterwards, and to report any adverse reaction to me immediately.       Screening performed by Alia Parra LPN on 5/31/2022 at 9:38 AM.

## 2022-05-31 NOTE — PROGRESS NOTES
"  Assessment & Plan     Essential hypertension  Blood pressure running high normal at home. Will increase losartan to 100mg/day and continue hydrochlorothiazide at 25. Check lytes in 2 weeks - lab only appointment. Check home data and let me know if blood pressure is running high or low. The patient indicates understanding of these issues and agrees with the plan.   - Basic metabolic panel  (Ca, Cl, CO2, Creat, Gluc, K, Na, BUN); Future  - losartan (COZAAR) 50 MG tablet; Take 2 tablets (100 mg) by mouth daily  - hydrochlorothiazide (HYDRODIURIL) 25 MG tablet; Take 1 tablet (25 mg) by mouth daily  - Basic metabolic panel  (Ca, Cl, CO2, Creat, Gluc, K, Na, BUN); Future  - Basic metabolic panel  (Ca, Cl, CO2, Creat, Gluc, K, Na, BUN)    Major depressive disorder, recurrent episode, moderate (H)  Doing well on the med. A little worried that it is causing hot flashes but I think this is also possibly due ot her age/menopause. Discussed with her. Will continue the med for now. The patient indicates understanding of these issues and agrees with the plan.   - sertraline (ZOLOFT) 100 MG tablet; Take 1 tablet (100 mg) by mouth daily    Lipid screening  Discussed   - Lipid panel reflex to direct LDL Fasting; Future  - Lipid panel reflex to direct LDL Fasting    Need for vaccination  Booster #2 for covid  shingrix #1 given today   The patient indicates understanding of these issues and agrees with the plan.        BMI:   Estimated body mass index is 28.05 kg/m  as calculated from the following:    Height as of this encounter: 1.702 m (5' 7\").    Weight as of this encounter: 81.2 kg (179 lb 1.6 oz).       Regular exercise    Return in about 2 weeks (around 6/14/2022) for Lab Work, BP Recheck.    Toya Licea MD  [facility] LINNEA Hennessy is a 51 year old who presents for the following health issues     History of Present Illness       Mental Health Follow-up:  Patient presents to follow-up on " "Depression.Patient's depression since last visit has been:  Better  The patient is not having other symptoms associated with depression.      Any significant life events: job concerns, financial concerns, grief or loss and other  Patient is not feeling anxious or having panic attacks.  Patient has no concerns about alcohol or drug use.    She eats 2-3 servings of fruits and vegetables daily.She consumes 0 sweetened beverage(s) daily.She exercises with enough effort to increase her heart rate 20 to 29 minutes per day.  She exercises with enough effort to increase her heart rate 5 days per week.   She is taking medications regularly.    Today's PHQ-9         PHQ-9 Total Score: 5    PHQ-9 Q9 Thoughts of better off dead/self-harm past 2 weeks :   Not at all    How difficult have these problems made it for you to do your work, take care of things at home, or get along with other people: Not difficult at all     Was seen in clinic virtually a month ago -   Was started on zoloft and tapered up to 100mg  100mg dose for 2-3 weeks  Had some headaches, fogginess, nausea - have resolved   Feels that she is having less negative thoughts, not obsessing over negative thoughts  Feeling happier   Feels med is helping   No si/ hi     Is having some hot flashes and wonders if they are due to the zoloft.     Has an iud in place     Hypertension   On losartan 50 and hydrochlorothiazide 25. Never did bmp after switch from ace to arb. Will do that today    Checking blood pressure  At home and it is running 120-130/high 80s.       Review of Systems   Constitutional, HEENT, cardiovascular, pulmonary, gi and gu systems are negative, except as otherwise noted.      Objective    /82   Pulse 74   Temp 98.8  F (37.1  C) (Tympanic)   Ht 1.702 m (5' 7\")   Wt 81.2 kg (179 lb 1.6 oz)   BMI 28.05 kg/m    Body mass index is 28.05 kg/m .  Physical Exam   GENERAL: healthy, alert and no distress  EYES: Eyes grossly normal to inspection, PERRL " and conjunctivae and sclerae normal  NECK: no adenopathy, no asymmetry, masses, or scars and thyroid normal to palpation  RESP: lungs clear to auscultation - no rales, rhonchi or wheezes  CV: regular rate and rhythm, normal S1 S2, no S3 or S4, no murmur, click or rub, no peripheral edema and peripheral pulses strong  NEURO: Normal strength and tone, mentation intact and speech normal  PSYCH: mentation appears normal, affect normal/bright

## 2022-07-03 DIAGNOSIS — I10 ESSENTIAL HYPERTENSION: ICD-10-CM

## 2022-07-05 RX ORDER — LOSARTAN POTASSIUM 50 MG/1
TABLET ORAL
Qty: 90 TABLET | Refills: 1 | OUTPATIENT
Start: 2022-07-05

## 2022-08-22 ENCOUNTER — TELEPHONE (OUTPATIENT)
Dept: FAMILY MEDICINE | Facility: CLINIC | Age: 52
End: 2022-08-22

## 2022-08-22 ENCOUNTER — OFFICE VISIT (OUTPATIENT)
Dept: FAMILY MEDICINE | Facility: CLINIC | Age: 52
End: 2022-08-22
Payer: COMMERCIAL

## 2022-08-22 VITALS
HEIGHT: 67 IN | SYSTOLIC BLOOD PRESSURE: 118 MMHG | WEIGHT: 176 LBS | TEMPERATURE: 98.7 F | OXYGEN SATURATION: 96 % | DIASTOLIC BLOOD PRESSURE: 84 MMHG | HEART RATE: 77 BPM | RESPIRATION RATE: 16 BRPM | BODY MASS INDEX: 27.62 KG/M2

## 2022-08-22 DIAGNOSIS — R10.32 ABDOMINAL PAIN, LEFT LOWER QUADRANT: Primary | ICD-10-CM

## 2022-08-22 DIAGNOSIS — R19.7 DIARRHEA OF PRESUMED INFECTIOUS ORIGIN: ICD-10-CM

## 2022-08-22 LAB
ALBUMIN SERPL-MCNC: 3.5 G/DL (ref 3.4–5)
ALP SERPL-CCNC: 59 U/L (ref 40–150)
ALT SERPL W P-5'-P-CCNC: 34 U/L (ref 0–50)
ANION GAP SERPL CALCULATED.3IONS-SCNC: 7 MMOL/L (ref 3–14)
AST SERPL W P-5'-P-CCNC: 15 U/L (ref 0–45)
BASOPHILS # BLD AUTO: 0 10E3/UL (ref 0–0.2)
BASOPHILS NFR BLD AUTO: 0 %
BILIRUB SERPL-MCNC: 0.4 MG/DL (ref 0.2–1.3)
BUN SERPL-MCNC: 19 MG/DL (ref 7–30)
CALCIUM SERPL-MCNC: 9.7 MG/DL (ref 8.5–10.1)
CHLORIDE BLD-SCNC: 108 MMOL/L (ref 94–109)
CO2 SERPL-SCNC: 26 MMOL/L (ref 20–32)
CREAT SERPL-MCNC: 0.67 MG/DL (ref 0.52–1.04)
EOSINOPHIL # BLD AUTO: 0 10E3/UL (ref 0–0.7)
EOSINOPHIL NFR BLD AUTO: 1 %
ERYTHROCYTE [DISTWIDTH] IN BLOOD BY AUTOMATED COUNT: 14.4 % (ref 10–15)
GFR SERPL CREATININE-BSD FRML MDRD: >90 ML/MIN/1.73M2
GLUCOSE BLD-MCNC: 82 MG/DL (ref 70–99)
HCT VFR BLD AUTO: 42.8 % (ref 35–47)
HGB BLD-MCNC: 13.7 G/DL (ref 11.7–15.7)
LYMPHOCYTES # BLD AUTO: 1.3 10E3/UL (ref 0.8–5.3)
LYMPHOCYTES NFR BLD AUTO: 23 %
MCH RBC QN AUTO: 30.5 PG (ref 26.5–33)
MCHC RBC AUTO-ENTMCNC: 32 G/DL (ref 31.5–36.5)
MCV RBC AUTO: 95 FL (ref 78–100)
MONOCYTES # BLD AUTO: 0.5 10E3/UL (ref 0–1.3)
MONOCYTES NFR BLD AUTO: 9 %
NEUTROPHILS # BLD AUTO: 3.7 10E3/UL (ref 1.6–8.3)
NEUTROPHILS NFR BLD AUTO: 67 %
PLATELET # BLD AUTO: 232 10E3/UL (ref 150–450)
POTASSIUM BLD-SCNC: 3.8 MMOL/L (ref 3.4–5.3)
PROT SERPL-MCNC: 7.5 G/DL (ref 6.8–8.8)
RBC # BLD AUTO: 4.49 10E6/UL (ref 3.8–5.2)
SODIUM SERPL-SCNC: 141 MMOL/L (ref 133–144)
WBC # BLD AUTO: 5.5 10E3/UL (ref 4–11)

## 2022-08-22 PROCEDURE — 85025 COMPLETE CBC W/AUTO DIFF WBC: CPT | Performed by: NURSE PRACTITIONER

## 2022-08-22 PROCEDURE — 36415 COLL VENOUS BLD VENIPUNCTURE: CPT | Performed by: NURSE PRACTITIONER

## 2022-08-22 PROCEDURE — 99214 OFFICE O/P EST MOD 30 MIN: CPT | Performed by: NURSE PRACTITIONER

## 2022-08-22 PROCEDURE — 80053 COMPREHEN METABOLIC PANEL: CPT | Performed by: NURSE PRACTITIONER

## 2022-08-22 NOTE — TELEPHONE ENCOUNTER
Patient's call transferred to author  Patient reports symptoms started Saturday and have been ongoing since onset    Patient reporting symptoms were worse on Saturday and Sunday  Today, symptoms seem to have improved     Patient had a fever intermittently on Saturday and Sunday  Temperature of 100 tympanic   Fever responded to OTC Tylenol   Denies fevers today    Reporting generalized abdominal pain - worse on the left side  States pain was worse on Saturday and Sunday - 9/10 on the pain scale   Today, pain has improved and rates pain a 5/10 - has only had 2 episodes of discomfort   Describes the pain as cramping  Denies pain radiating   Denies OTC medications helping with pain     Patient has been following a liquid diet  Tolerating liquid diet today   Denies nausea/vomiting  Denies feeling dizzy/lightheaded    Reporting diarrhea - worse Saturday and Sunday  Reported having diarrhea hourly those days   Today, patient has had 3 episodes of diarrhea  Patient describes diarrhea as liquid and maroon in color  Does report some bloody mucous intermittently with stools     Appointment scheduled with K.St Plascencia for 8/22/2022 at 1520   Patient verbalized understanding  No further questions/concerns    Kevin Le RN

## 2022-08-22 NOTE — PROGRESS NOTES
Assessment/Plan:   1. Abdominal pain, left lower quadrant  DD: viral gastroenteritis, diverticulitis will evaluate further with CT scan to rule out Diverticulitis  - Comprehensive metabolic panel (BMP + Alb, Alk Phos, ALT, AST, Total. Bili, TP); Future  - CT Abdomen Pelvis w Contrast; Future  - Comprehensive metabolic panel (BMP + Alb, Alk Phos, ALT, AST, Total. Bili, TP)    2. Diarrhea of presumed infectious origin  Will test further with the bloody diarrhea, will test for infectious causes.   - Occult blood stool 1-3 spec; Future  - Enteric Bacteria and Virus Panel by [facility] Stool; Future  - Clostridium difficile Toxin B PCR; Future  - CBC with platelets and differential; Future  - Ova and Parasite Exam Routine; Future  - Cryptosporidium/Giardia Immunoassay; Future  - CBC with platelets and differential      Return in about 1 week (around 8/29/2022), or if symptoms worsen or fail to improve, for Follow up.      Emily St. Plascencia, APRN, CNP      Soraida Hennessy is a 52 year old, presenting for the following health issues:  Abdominal Pain      History of Present Illness       Reason for visit:  Gastrointestinal cramping, fever, diarrhea  Symptom onset:  1-3 days ago  Symptoms include:  Cramping, fever, bloody diarrhea  Symptom intensity:  Severe  Symptom progression:  Improving  Had these symptoms before:  Yes  Has tried/received treatment for these symptoms:  No  What makes it worse:  No  What makes it better:  No    She eats 2-3 servings of fruits and vegetables daily.She consumes 0 sweetened beverage(s) daily.She exercises with enough effort to increase her heart rate 20 to 29 minutes per day.  She exercises with enough effort to increase her heart rate 5 days per week.   She is taking medications regularly.     Has had 3 days of bloody diarrhea, left lower quadrant pain, cramping, signficant bloating and fevers. No known exposure illnesses, no known food poisioning. No history of diverticulitis. Has an IUD,  "stills has ovaries, uterus.     Review of Systems   Constitutional, HEENT, cardiovascular, pulmonary, gi and gu systems are negative, except as otherwise noted.      Objective    /84 (BP Location: Right arm, Patient Position: Sitting, Cuff Size: Adult Large)   Pulse 77   Temp 98.7  F (37.1  C) (Tympanic)   Resp 16   Ht 1.702 m (5' 7\")   Wt 79.8 kg (176 lb)   SpO2 96%   Breastfeeding No   BMI 27.57 kg/m    Body mass index is 27.57 kg/m .  Physical Exam  Constitutional:       Appearance: Normal appearance.   Cardiovascular:      Rate and Rhythm: Normal rate and regular rhythm.      Heart sounds: Normal heart sounds.   Pulmonary:      Breath sounds: Normal breath sounds.   Abdominal:      General: Bowel sounds are normal.      Palpations: Abdomen is soft.      Tenderness: There is guarding. There is no right CVA tenderness or left CVA tenderness.       Neurological:      Mental Status: She is alert.                  " No

## 2022-08-23 ENCOUNTER — HOSPITAL ENCOUNTER (OUTPATIENT)
Dept: CT IMAGING | Facility: CLINIC | Age: 52
Discharge: HOME | End: 2022-08-23
Attending: NURSE PRACTITIONER | Admitting: NURSE PRACTITIONER
Payer: COMMERCIAL

## 2022-08-23 DIAGNOSIS — R10.32 ABDOMINAL PAIN, LEFT LOWER QUADRANT: ICD-10-CM

## 2022-08-23 PROCEDURE — 250N000011 HC RX IP 250 OP 636: Performed by: RADIOLOGY

## 2022-08-23 PROCEDURE — 74177 CT ABD & PELVIS W/CONTRAST: CPT

## 2022-08-23 PROCEDURE — 250N000009 HC RX 250: Performed by: RADIOLOGY

## 2022-08-23 RX ORDER — IOPAMIDOL 755 MG/ML
77 INJECTION, SOLUTION INTRAVASCULAR ONCE
Status: COMPLETED | OUTPATIENT
Start: 2022-08-23 | End: 2022-08-23

## 2022-08-23 RX ADMIN — SODIUM CHLORIDE 61 ML: 9 INJECTION, SOLUTION INTRAVENOUS at 17:30

## 2022-08-23 RX ADMIN — IOPAMIDOL 77 ML: 755 INJECTION, SOLUTION INTRAVENOUS at 17:30

## 2022-08-24 ENCOUNTER — TELEPHONE (OUTPATIENT)
Dept: FAMILY MEDICINE | Facility: CLINIC | Age: 52
End: 2022-08-24

## 2022-08-24 DIAGNOSIS — R10.32 ABDOMINAL PAIN, LEFT LOWER QUADRANT: Primary | ICD-10-CM

## 2022-08-24 DIAGNOSIS — R19.7 DIARRHEA OF PRESUMED INFECTIOUS ORIGIN: ICD-10-CM

## 2022-08-24 NOTE — TELEPHONE ENCOUNTER
Left a nonspecific message to call me back regarding results.     EXAM: CT ABDOMEN PELVIS W CONTRAST  LOCATION: [facility]  DATE/TIME: 8/23/2022 5:37 PM     INDICATION:  Abdominal pain, left lower quadrant  COMPARISON: None.  TECHNIQUE: CT scan of the abdomen and pelvis was performed following injection of IV contrast. Multiplanar reformats were obtained. Dose reduction techniques were used.  CONTRAST: 77 mL Isovue 370     FINDINGS:   LOWER CHEST: Unremarkable.     HEPATOBILIARY: Diffuse hepatic steatosis.     PANCREAS: Normal.     SPLEEN: Normal.     ADRENAL GLANDS: Normal.     KIDNEYS/BLADDER: No hydronephrosis. Urinary bladder is unremarkable.     BOWEL: Focal inflammation and wall thickening of the sigmoid colon, appears to be centered around a fat density structure at the lateral aspect of the mid sigmoid colon (series 2 image 180). There is extensive diverticulosis in this area as well. No   evidence of bowel obstruction, perforation or drainable fluid collection.     LYMPH NODES: Normal.     VASCULATURE: Minimal calcified atherosclerosis.     PELVIC ORGANS: IUD within the endometrial canal. Likely posterior fundal uterine fibroid.     MUSCULOSKELETAL: No acute bony abnormality.                                                                         IMPRESSION:   1.  Inflammatory changes in/adjacent to the sigmoid colon, favor epiploic appendagitis, less likely uncomplicated diverticulitis. Consider follow-up colonoscopy after resolution of acute symptoms if not previously performed.  2.  Hepatic steatosis.    Ordered a Colonoscopy. A Patient message was also sent regarding the results.     Emily Washington, ROSALINDA, CNP

## 2022-09-11 ENCOUNTER — HEALTH MAINTENANCE LETTER (OUTPATIENT)
Age: 52
End: 2022-09-11

## 2022-09-28 ENCOUNTER — ANESTHESIA EVENT (OUTPATIENT)
Dept: GASTROENTEROLOGY | Facility: CLINIC | Age: 52
End: 2022-09-28
Payer: COMMERCIAL

## 2022-09-28 NOTE — ANESTHESIA PREPROCEDURE EVALUATION
Anesthesia Pre-Procedure Evaluation    Patient: Minoo Villavicencio   MRN: [id number] : 1970        Procedure : Procedure(s):  COLONOSCOPY          No past medical history on file.   No past surgical history on file.   No Known Allergies   Social History     Tobacco Use     Smoking status: Never Smoker     Smokeless tobacco: Never Used   Substance Use Topics     Alcohol use: Yes      Wt Readings from Last 1 Encounters:   22 79.8 kg (176 lb)        Anesthesia Evaluation            ROS/MED HX  ENT/Pulmonary:       Neurologic:       Cardiovascular:     (+) hypertension-----Previous cardiac testing   Echo: Date: 3-2022 Results:  Interpretation Summary     The visual ejection fraction is 60-65%.  The right ventricle is normal in size and function.  No hemodynamically significant valvular disease.  The inferior vena cava was normal in size with preserved respiratory  variability.  There is no pericardial effusion.  A patent foramen ovale is suspected on subcoastal color doppler.  Stress Test: Date:  Results:  Interpretation Summary  The Duke treadmill score was low risk ( >5 Duke score) suggesting low  likelihood of obstructive CAD.  There was no chest pain or significant ST changes with exercise.  This was a normal stress echocardiogram with no evidence of stress-induced  ischemia.  Cannot rule out a small anterior pericardial effusion around the RV on limited  views on this stress test. Recommend full echo for evaluation.  ECG Reviewed: Date: Results:    Cath: Date: Results:      METS/Exercise Tolerance:     Hematologic:       Musculoskeletal:       GI/Hepatic: Comment: Abdominal pain and diarrhea      Renal/Genitourinary:       Endo: Comment: overweight      Psychiatric/Substance Use:     (+) psychiatric history depression     Infectious Disease:       Malignancy:       Other:            Physical Exam    Airway  airway exam normal      Mallampati: II   TM distance: > 3 FB   Neck ROM: full   Mouth  opening: > 3 cm    Respiratory Devices and Support         Dental  no notable dental history         Cardiovascular   cardiovascular exam normal          Pulmonary   pulmonary exam normal                OUTSIDE LABS:  CBC:   Lab Results   Component Value Date    WBC 5.5 08/22/2022    WBC 5.1 08/18/2020    HGB 13.7 08/22/2022    HGB 15.7 08/18/2020    HCT 42.8 08/22/2022    HCT 48.7 (H) 08/18/2020     08/22/2022     08/18/2020     BMP:   Lab Results   Component Value Date     08/22/2022     05/31/2022    POTASSIUM 3.8 08/22/2022    POTASSIUM 3.6 05/31/2022    CHLORIDE 108 08/22/2022    CHLORIDE 106 05/31/2022    CO2 26 08/22/2022    CO2 29 05/31/2022    BUN 19 08/22/2022    BUN 17 05/31/2022    CR 0.67 08/22/2022    CR 0.73 05/31/2022    GLC 82 08/22/2022     (H) 05/31/2022     COAGS: No results found for: PTT, INR, FIBR  POC: No results found for: BGM, HCG, HCGS  HEPATIC:   Lab Results   Component Value Date    ALBUMIN 3.5 08/22/2022    PROTTOTAL 7.5 08/22/2022    ALT 34 08/22/2022    AST 15 08/22/2022    ALKPHOS 59 08/22/2022    BILITOTAL 0.4 08/22/2022     OTHER:   Lab Results   Component Value Date    JOSE 9.7 08/22/2022    TSH 1.94 08/18/2020       Anesthesia Plan    ASA Status:  2   NPO Status:  NPO Appropriate    Anesthesia Type: General.     - Airway: Native airway   Induction: Propofol, Intravenous.   Maintenance: TIVA.        Consents    Anesthesia Plan(s) and associated risks, benefits, and realistic alternatives discussed. Questions answered and patient/representative(s) expressed understanding.     - Discussed: Risks, Benefits and Alternatives for BOTH SEDATION and the PROCEDURE were discussed     - Discussed with:  Patient      - Extended Intubation/Ventilatory Support Discussed: No.      - Patient is DNR/DNI Status: No    Use of blood products discussed: No .     Postoperative Care    Pain management: Multi-modal analgesia.   PONV prophylaxis: Background Propofol  Infusion     Comments:                ROSALINDA Little CRNA

## 2022-10-10 ENCOUNTER — ANESTHESIA (OUTPATIENT)
Dept: GASTROENTEROLOGY | Facility: CLINIC | Age: 52
End: 2022-10-10
Payer: COMMERCIAL

## 2022-11-23 DIAGNOSIS — I10 ESSENTIAL HYPERTENSION: ICD-10-CM

## 2022-11-23 DIAGNOSIS — F33.1 MAJOR DEPRESSIVE DISORDER, RECURRENT EPISODE, MODERATE (H): ICD-10-CM

## 2022-11-23 RX ORDER — SERTRALINE HYDROCHLORIDE 100 MG/1
TABLET, FILM COATED ORAL
Qty: 90 TABLET | Refills: 1 | Status: SHIPPED | OUTPATIENT
Start: 2022-11-23 | End: 2023-05-25

## 2022-11-23 RX ORDER — LOSARTAN POTASSIUM 50 MG/1
TABLET ORAL
Qty: 180 TABLET | Refills: 1 | Status: SHIPPED | OUTPATIENT
Start: 2022-11-23 | End: 2023-05-25

## 2022-11-29 ENCOUNTER — TELEPHONE (OUTPATIENT)
Dept: GASTROENTEROLOGY | Facility: CLINIC | Age: 52
End: 2022-11-29

## 2022-11-29 RX ORDER — BISACODYL 5 MG
TABLET, DELAYED RELEASE (ENTERIC COATED) ORAL
Qty: 4 TABLET | Refills: 0 | Status: SHIPPED | OUTPATIENT
Start: 2022-11-29 | End: 2023-09-26

## 2022-11-29 NOTE — TELEPHONE ENCOUNTER
Screening Questions  BLUE  KIND OF PREP RED  LOCATION [review exclusion criteria] GREEN  SEDATION TYPE        Y Are you active on mychart?       MED[facility] Ordering/Referring Provider?        NA What type of coverage do you have?      N Have you had a positive covid test in the last 14 days?     N 1. BMI  [BMI 40+ - review exclusion criteria]    Y  2. Are you able to give consent for your medical care? [IF NO,RN REVIEW]        N  3. Are you taking any prescription pain medications on a routine schedule?      N  3a. EXTENDED PREP What kind of prescription?     N 4. Do you have any chemical dependencies such as alcohol, street drugs, or methadone?    N 5. Do you have any history of post-traumatic stress syndrome, severe anxiety or history of psychosis?      **If yes 3- 5 , please schedule with MAC sedation.**          IF YES TO ANY 6 - 10 - HOSPITAL SETTING ONLY.     N 6.   Do you need assistance transferring?     N 7.   Have you had a heart or lung transplant?    N 8.   Are you currently on dialysis?   N 9.   Do you use daily home oxygen?   N 10. Do you take nitroglycerin?   10a. N If yes, how often?     11. [FEMALES]  N Are you currently pregnant?    11a. N If yes, how many weeks? [ Greater than 12 weeks, OR NEEDED]    N 12. Do you have Pulmonary Hypertension? *NEED PAC APPT AT [facility]*     N 13. [review exclusion criteria]  Do you have any implantable devices in your body (pacemaker, defib, LVAD)?    N 14. In the past 6 months, have you had any heart related issues including cardiomyopathy or heart attack?     14a. N If yes, did it require cardiac stenting if so when?     N 15. Have you had a stroke or Transient ischemic attack (TIA - aka  mini stroke ) within 6 months?      N 16. Do you have mod to severe Obstructive Sleep Apnea?  [Hospital only - Ok at [place]]    N 17. Do you have SEVERE AND UNCONTROLLED asthma? *NEED PAC APPT AT [facility]*     N 18. Are you currently taking any blood thinners?     18a. If yes,  "inform patient to \"follow up w/ ordering provider for bridging instructions.\"    N 19. Do you take the medication Phentermine?    19a. If yes, \"Hold for 7 days before procedure.  Please consult your prescribing provider if you have questions about holding this medication.\"     N  20. Do you have chronic kidney disease?      N  21. Do you have a diagnosis of diabetes?     N  22. On a regular basis do you go 3-5 days between bowel movements?     Y 23. Preferred LOCAL Pharmacy for Pre Prescription    [ LIST ONLY ONE PHARMACY]     [facility]ARMACY #[id number] - [address]        - CLOSING REMINDERS -    Informed patient they will need an adult    Cannot take any type of public or medical transportation alone    Conscious Sedation- Needs  for 6 hours after the procedure       MAC/General-Needs  for 24 hours after procedure    Pre-Procedure Covid test to be completed [[facility] PCR Testing Required]    Confirmed Nurse will call to complete assessment       - SCHEDULING DETAILS -     MEYER  Surgeon    12/5/22  Date of Procedure  Lower Endoscopy [Colonoscopy]  Type of Procedure Scheduled  [facility] - [place], [place]-If you answer yes to questions #8, #20, #21Which Colonoscopy Prep was Sent?     MAC Sedation Type     NA PAC / Pre-op Required         Additional comments:            "

## 2022-12-05 ENCOUNTER — HOSPITAL ENCOUNTER (OUTPATIENT)
Facility: CLINIC | Age: 52
Discharge: HOME | End: 2022-12-05
Attending: SURGERY | Admitting: SURGERY
Payer: COMMERCIAL

## 2022-12-05 VITALS
BODY MASS INDEX: 27.62 KG/M2 | HEART RATE: 65 BPM | DIASTOLIC BLOOD PRESSURE: 87 MMHG | SYSTOLIC BLOOD PRESSURE: 157 MMHG | TEMPERATURE: 98.6 F | OXYGEN SATURATION: 99 % | HEIGHT: 67 IN | WEIGHT: 176 LBS | RESPIRATION RATE: 16 BRPM

## 2022-12-05 DIAGNOSIS — Z12.11 SPECIAL SCREENING FOR MALIGNANT NEOPLASMS, COLON: Primary | ICD-10-CM

## 2022-12-05 LAB — COLONOSCOPY: NORMAL

## 2022-12-05 PROCEDURE — 88305 TISSUE EXAM BY PATHOLOGIST: CPT | Mod: 26 | Performed by: PATHOLOGY

## 2022-12-05 PROCEDURE — 250N000011 HC RX IP 250 OP 636: Performed by: NURSE ANESTHETIST, CERTIFIED REGISTERED

## 2022-12-05 PROCEDURE — 45380 COLONOSCOPY AND BIOPSY: CPT | Mod: PT | Performed by: SURGERY

## 2022-12-05 PROCEDURE — 250N000009 HC RX 250: Performed by: NURSE ANESTHETIST, CERTIFIED REGISTERED

## 2022-12-05 PROCEDURE — 258N000003 HC RX IP 258 OP 636: Performed by: SURGERY

## 2022-12-05 PROCEDURE — 370N000017 HC ANESTHESIA TECHNICAL FEE, PER MIN: Performed by: SURGERY

## 2022-12-05 PROCEDURE — 88305 TISSUE EXAM BY PATHOLOGIST: CPT | Mod: TC | Performed by: SURGERY

## 2022-12-05 RX ORDER — LIDOCAINE 40 MG/G
CREAM TOPICAL
Status: DISCONTINUED | OUTPATIENT
Start: 2022-12-05 | End: 2022-12-05 | Stop reason: HOSPADM

## 2022-12-05 RX ORDER — ONDANSETRON 2 MG/ML
4 INJECTION INTRAMUSCULAR; INTRAVENOUS EVERY 30 MIN PRN
Status: DISCONTINUED | OUTPATIENT
Start: 2022-12-05 | End: 2022-12-05 | Stop reason: HOSPADM

## 2022-12-05 RX ORDER — PROPOFOL 10 MG/ML
INJECTION, EMULSION INTRAVENOUS PRN
Status: DISCONTINUED | OUTPATIENT
Start: 2022-12-05 | End: 2022-12-05

## 2022-12-05 RX ORDER — ONDANSETRON 4 MG/1
4 TABLET, ORALLY DISINTEGRATING ORAL EVERY 30 MIN PRN
Status: DISCONTINUED | OUTPATIENT
Start: 2022-12-05 | End: 2022-12-05 | Stop reason: HOSPADM

## 2022-12-05 RX ORDER — LIDOCAINE HYDROCHLORIDE 10 MG/ML
INJECTION, SOLUTION EPIDURAL; INFILTRATION; INTRACAUDAL; PERINEURAL PRN
Status: DISCONTINUED | OUTPATIENT
Start: 2022-12-05 | End: 2022-12-05

## 2022-12-05 RX ORDER — SODIUM CHLORIDE, SODIUM LACTATE, POTASSIUM CHLORIDE, CALCIUM CHLORIDE 600; 310; 30; 20 MG/100ML; MG/100ML; MG/100ML; MG/100ML
INJECTION, SOLUTION INTRAVENOUS CONTINUOUS
Status: DISCONTINUED | OUTPATIENT
Start: 2022-12-05 | End: 2022-12-05 | Stop reason: HOSPADM

## 2022-12-05 RX ORDER — GLYCOPYRROLATE 0.2 MG/ML
INJECTION, SOLUTION INTRAMUSCULAR; INTRAVENOUS PRN
Status: DISCONTINUED | OUTPATIENT
Start: 2022-12-05 | End: 2022-12-05

## 2022-12-05 RX ORDER — PROPOFOL 10 MG/ML
INJECTION, EMULSION INTRAVENOUS CONTINUOUS PRN
Status: DISCONTINUED | OUTPATIENT
Start: 2022-12-05 | End: 2022-12-05

## 2022-12-05 RX ORDER — ALBUTEROL SULFATE 0.83 MG/ML
2.5 SOLUTION RESPIRATORY (INHALATION) EVERY 4 HOURS PRN
Status: DISCONTINUED | OUTPATIENT
Start: 2022-12-05 | End: 2022-12-05 | Stop reason: HOSPADM

## 2022-12-05 RX ADMIN — SODIUM CHLORIDE, POTASSIUM CHLORIDE, SODIUM LACTATE AND CALCIUM CHLORIDE: 600; 310; 30; 20 INJECTION, SOLUTION INTRAVENOUS at 12:20

## 2022-12-05 RX ADMIN — PROPOFOL 30 MG: 10 INJECTION, EMULSION INTRAVENOUS at 12:46

## 2022-12-05 RX ADMIN — PROPOFOL 150 MCG/KG/MIN: 10 INJECTION, EMULSION INTRAVENOUS at 12:46

## 2022-12-05 RX ADMIN — LIDOCAINE HYDROCHLORIDE 100 MG: 10 INJECTION, SOLUTION EPIDURAL; INFILTRATION; INTRACAUDAL; PERINEURAL at 12:46

## 2022-12-05 RX ADMIN — GLYCOPYRROLATE 0.1 MG: 0.2 INJECTION, SOLUTION INTRAMUSCULAR; INTRAVENOUS at 12:45

## 2022-12-05 RX ADMIN — PROPOFOL 50 MG: 10 INJECTION, EMULSION INTRAVENOUS at 12:48

## 2022-12-05 ASSESSMENT — ACTIVITIES OF DAILY LIVING (ADL): ADLS_ACUITY_SCORE: 35

## 2022-12-05 NOTE — ANESTHESIA POSTPROCEDURE EVALUATION
Patient: Minoo PAYNE Beloyhenry    Procedure: Procedure(s):  COLONOSCOPY, WITH POLYPECTOMY AND BIOPSY       Anesthesia Type:  General    Note:  Disposition: Outpatient   Postop Pain Control: Uneventful            Sign Out: Well controlled pain   PONV: No   Neuro/Psych: Uneventful            Sign Out: Acceptable/Baseline neuro status   Airway/Respiratory: Uneventful            Sign Out: Acceptable/Baseline resp. status   CV/Hemodynamics: Uneventful            Sign Out: Acceptable CV status; No obvious hypovolemia; No obvious fluid overload   Other NRE: NONE   DID A NON-ROUTINE EVENT OCCUR? No           Last vitals:  Vitals:    12/05/22 1157   BP: (!) 174/90   Pulse: 68   Resp: 16   Temp: 36.8  C (98.3  F)   SpO2: 93%       Electronically Signed By: ROSALINDA Carlson CRNA  December 5, 2022  1:06 PM

## 2022-12-05 NOTE — LETTER
Minoo Villavicencio  [address] [facility] [address]    December 8, 2022    Dear Minoo,  This letter is written to inform you of the results of your recent colonoscopy.  Your examination showed polyp(s) in your transverse colon. All polyps were removed in their entirety and sent for review by a pathologist. As you will see on the pathology report below, the tissue(s) were tubular adenomatous polyps. Your examination was otherwise without abnormality.    Final Diagnosis   Large intestine, transverse, polyp, biopsy/polypectomy:  - Tubular adenoma negative for high-grade dysplasia.     Adenomatous polyps are entirely benign (non-cancerous); however, patients who have developed these polyps are at an increased risk for developing additional polyps in the future. If these are not eventually removed, there is a risk of developing colon cancer. We will advise more frequent examinations with you because of the risk associated with this type of polyp.    Given these findings,  I recommend that you undergo a repeat colonoscopy in 7 year(s) for surveillance. We will enter you into a recall system so you receive a reminder closer to the time that you are due for repeat examination.     Please remember that this recommendation is made with the understanding that you are not experiencing persistent changes in bowel function, bleeding per rectum, and/or significant abdominal pain. If you experience these symptoms, please contact your primary care provider for a further evaluation.     If you have any questions or concerns about the results of your colonoscopy or the appropriate follow-up, please contact my assistant at [phone].      Sincerely,        [facility],   [facility]  ___

## 2022-12-05 NOTE — H&P
[facility]    Pre-Endoscopy History and Physical     Minoo Villavicencio MRN# [id number]   YOB: 1970 Age: 52 year old     Date of Procedure: 12/5/2022  Primary care provider: Magalys Sigala  Type of Endoscopy: Colonoscopy with possible biopsy, possible polypectomy  Reason for Procedure: diarrhea and LLQ pain   Type of Anesthesia Anticipated: MAC    HPI:    Minoo is a 52 year old female who will be undergoing the above procedure.  Diarrhea and LLQ since august 2022; no blood thinner; no famhx of colon cancer;     A history and physical has been performed. The patient's medications and allergies have been reviewed. The risks and benefits of the procedure and the sedation options and risks were discussed with the patient.  All questions were answered and informed consent was obtained.      She denies a personal or family history of anesthesia complications or bleeding disorders.     Patient Active Problem List   Diagnosis     Varicose Veins     Rosacea     Essential hypertension     IUD (intrauterine device) in place     Major depressive disorder, recurrent episode, moderate (H)        History reviewed. No pertinent past medical history.     History reviewed. No pertinent surgical history.    Social History     Tobacco Use     Smoking status: Never     Smokeless tobacco: Never   Substance Use Topics     Alcohol use: Yes       Family History   Problem Relation Age of Onset     Cerebrovascular Disease Father      Other Cancer Mother      Breast Cancer No family hx of        Prior to Admission medications    Medication Sig Start Date End Date Taking? Authorizing Provider   bisacodyl (DULCOLAX) 5 MG EC tablet Take 2 tablets at 3 pm the day before your procedure. If your procedure is before 11 am, take 2 additional tablets at 11 pm. If your procedure is after 11 am, take 2 additional tablets at 6 am. For additional instructions refer to your colonoscopy prep instructions. 11/29/22  Yes  "Johnathan Oconnell MD   hydrochlorothiazide (HYDRODIURIL) 25 MG tablet Take 1 tablet (25 mg) by mouth daily 5/31/22  Yes Toya Licea MD   losartan (COZAAR) 50 MG tablet TAKE 2 TABLETS BY MOUTH EVERY DAY 11/23/22  Yes Troy Ludwig MD   omeprazole (PRILOSEC) 40 MG capsule [OMEPRAZOLE (PRILOSEC) 40 MG CAPSULE] TAKE ONE CAPSULE BY MOUTH EVERY DAY BEFORE BREAKFAST 9/23/19  Yes Magalys Sigala MD   polyethylene glycol (GOLYTELY) 236 g suspension The night before the exam at 6 pm drink an 8-ounce glass every 15 minutes until the jug is half empty. If you arrive before 11 AM: Drink the other half of the Golytely jug at 11 PM night before procedure. If you arrive after 11 AM: Drink the other half of the Golytely jug at 6 AM day of procedure. For additional instructions refer to your colonoscopy prep instructions. 11/29/22  Yes Johnathan Oconnell MD   sertraline (ZOLOFT) 100 MG tablet TAKE 1 TABLET BY MOUTH EVERY DAY 11/23/22  Yes Troy Ludwig MD       No Known Allergies     REVIEW OF SYSTEMS:   5 point ROS negative except as noted above in HPI, including Gen., Resp., CV, GI &  system review.    PHYSICAL EXAM:   BP (!) 174/90 (BP Location: Right arm)   Pulse 68   Temp 98.3  F (36.8  C) (Oral)   Resp 16   Ht 1.702 m (5' 7\")   Wt 79.8 kg (176 lb)   SpO2 93%   BMI 27.57 kg/m   Estimated body mass index is 27.57 kg/m  as calculated from the following:    Height as of this encounter: 1.702 m (5' 7\").    Weight as of this encounter: 79.8 kg (176 lb).   Constitutional: Awake, alert, no acute distress.  Eyes: No scleral icterus.  Conjunctiva are without injection.  ENMT: Mucous membranes moist, dentition and gums are intact.   Neck: Soft, supple, trachea midline.    Endocrine: n/a   Lymphatic: There is no cervical, submandibularadenopathy.  Respiratory: normal effortgs   Cardiovascular: S1, S2  Abdomen: Non-distended, non-tender,  No masses,  Musculoskeletal: No spinal or CVA tenderness. Full range of motion in the " upper and lower extremities.    Skin: No skin rashes or lesions to inspection.  No petechia.    Neurologic: alerted and oriented 3x  Psychiatric: The patient's affect is not blunted and mood is appropriate.  DIAGNOSTICS:    Not indicated    IMPRESSION   ASA Class 2 - Mild systemic disease    PLAN:   Plan for Colonoscopy with possible biopsy, possible polypectomy. We discussed the risks, benefits and alternatives and the patient wished to proceed.  Patient is cleared for the above procedure.    The above has been forwarded to the consulting provider.    [facility], [facility]

## 2022-12-05 NOTE — ANESTHESIA CARE TRANSFER NOTE
Patient: Minoo PAYNE Beloyhenry    Procedure: Procedure(s):  COLONOSCOPY, WITH POLYPECTOMY AND BIOPSY       Diagnosis: Abdominal pain, left lower quadrant [R10.32]  Diarrhea of presumed infectious origin [R19.7]  Diagnosis Additional Information: No value filed.    Anesthesia Type:   General     Note:    Oropharynx: oropharynx clear of all foreign objects and spontaneously breathing  Level of Consciousness: awake  Oxygen Supplementation: room air    Independent Airway: airway patency satisfactory and stable  Dentition: dentition unchanged  Vital Signs Stable: post-procedure vital signs reviewed and stable  Report to RN Given: handoff report given  Patient transferred to: Phase II    Handoff Report: Identifed the Patient, Identified the Reponsible Provider, Reviewed the pertinent medical history, Discussed the surgical course, Reviewed Intra-OP anesthesia mangement and issues during anesthesia, Set expectations for post-procedure period and Allowed opportunity for questions and acknowledgement of understanding      Vitals:  Vitals Value Taken Time   BP     Temp     Pulse     Resp     SpO2         Electronically Signed By: ROSALINDA Carlson CRNA  December 5, 2022  1:06 PM

## 2022-12-07 LAB
PATH REPORT.COMMENTS IMP SPEC: NORMAL
PATH REPORT.COMMENTS IMP SPEC: NORMAL
PATH REPORT.FINAL DX SPEC: NORMAL
PATH REPORT.GROSS SPEC: NORMAL
PATH REPORT.MICROSCOPIC SPEC OTHER STN: NORMAL
PATH REPORT.RELEVANT HX SPEC: NORMAL
PHOTO IMAGE: NORMAL

## 2023-01-04 DIAGNOSIS — I10 ESSENTIAL HYPERTENSION: ICD-10-CM

## 2023-01-05 NOTE — TELEPHONE ENCOUNTER
"Requested Prescriptions   Pending Prescriptions Disp Refills    hydrochlorothiazide (HYDRODIURIL) 25 MG tablet [Pharmacy Med Name: HYDROCHLOROTHIAZIDE 25 MG TAB] 90 tablet 1     Sig: TAKE 1 TABLET BY MOUTH EVERY DAY       Diuretics (Including Combos) Protocol Failed - 1/4/2023 12:19 AM        Failed - Blood pressure under 140/90 in past 12 months     BP Readings from Last 3 Encounters:   12/05/22 (!) 157/87   08/22/22 118/84   05/31/22 136/82                 Passed - Recent (12 mo) or future (30 days) visit within the authorizing provider's specialty     Patient has had an office visit with the authorizing provider or a provider within the authorizing providers department within the previous 12 mos or has a future within next 30 days. See \"Patient Info\" tab in inbasket, or \"Choose Columns\" in Meds & Orders section of the refill encounter.              Passed - Medication is active on med list        Passed - Patient is age 18 or older        Passed - No active pregancy on record        Passed - Normal serum creatinine on file in past 12 months     Recent Labs   Lab Test 08/22/22  1643   CR 0.67              Passed - Normal serum potassium on file in past 12 months     Recent Labs   Lab Test 08/22/22  1643   POTASSIUM 3.8                    Passed - Normal serum sodium on file in past 12 months     Recent Labs   Lab Test 08/22/22  1643                 Passed - No positive pregnancy test in past 12 months           Ying Vanessa RN     "

## 2023-01-07 RX ORDER — HYDROCHLOROTHIAZIDE 25 MG/1
TABLET ORAL
Qty: 90 TABLET | Refills: 1 | Status: SHIPPED | OUTPATIENT
Start: 2023-01-07 | End: 2023-07-17

## 2023-01-07 NOTE — TELEPHONE ENCOUNTER
Please call her and see if home blood pressure is looking good - put new home normal blood pressure in system.     Had labs in august so I will refill for now.    LARA Licea MD        BP Readings from Last 6 Encounters:   12/05/22 (!) 157/87   08/22/22 118/84   05/31/22 136/82   01/07/22 126/76   07/27/21 112/86   03/04/21 134/82

## 2023-01-16 NOTE — TELEPHONE ENCOUNTER
Spoke with patient.  She will check BP when she gets home and send mychart message with reading.  Magalys Nelson, CMA

## 2023-01-22 ENCOUNTER — HEALTH MAINTENANCE LETTER (OUTPATIENT)
Age: 53
End: 2023-01-22

## 2023-02-08 ENCOUNTER — LAB (OUTPATIENT)
Dept: LAB | Facility: CLINIC | Age: 53
End: 2023-02-08
Attending: EMERGENCY MEDICINE
Payer: COMMERCIAL

## 2023-02-08 ENCOUNTER — TELEPHONE (OUTPATIENT)
Dept: URGENT CARE | Facility: URGENT CARE | Age: 53
End: 2023-02-08

## 2023-02-08 ENCOUNTER — DOCUMENTATION ONLY (OUTPATIENT)
Dept: LAB | Facility: CLINIC | Age: 53
End: 2023-02-08

## 2023-02-08 ENCOUNTER — E-VISIT (OUTPATIENT)
Dept: URGENT CARE | Facility: CLINIC | Age: 53
End: 2023-02-08
Payer: COMMERCIAL

## 2023-02-08 DIAGNOSIS — Z20.822 SUSPECTED COVID-19 VIRUS INFECTION: ICD-10-CM

## 2023-02-08 DIAGNOSIS — J02.9 PHARYNGITIS, UNSPECIFIED ETIOLOGY: Primary | ICD-10-CM

## 2023-02-08 DIAGNOSIS — R05.1 ACUTE COUGH: ICD-10-CM

## 2023-02-08 DIAGNOSIS — R05.1 ACUTE COUGH: Primary | ICD-10-CM

## 2023-02-08 DIAGNOSIS — J02.9 PHARYNGITIS, UNSPECIFIED ETIOLOGY: ICD-10-CM

## 2023-02-08 LAB
DEPRECATED S PYO AG THROAT QL EIA: NEGATIVE
FLUAV RNA SPEC QL NAA+PROBE: NEGATIVE
FLUBV RNA RESP QL NAA+PROBE: NEGATIVE
RSV RNA SPEC NAA+PROBE: NEGATIVE
SARS-COV-2 RNA RESP QL NAA+PROBE: NEGATIVE

## 2023-02-08 PROCEDURE — 87651 STREP A DNA AMP PROBE: CPT

## 2023-02-08 PROCEDURE — 87637 SARSCOV2&INF A&B&RSV AMP PRB: CPT

## 2023-02-08 PROCEDURE — 99421 OL DIG E/M SVC 5-10 MIN: CPT | Mod: CS | Performed by: EMERGENCY MEDICINE

## 2023-02-08 NOTE — PROGRESS NOTES
Patient coming for Flu/Covid/Strep test - there are no orders for Strep test.  Please place future order as testing will not be performed until an order is received.  Thank you!

## 2023-02-08 NOTE — PATIENT INSTRUCTIONS
Minoo,    Your symptoms show that you may have coronavirus (COVID-19). This illness can cause fever, cough and trouble breathing. Many people get a mild case and get better on their own. Some people can get very sick.    Because you reported additional symptoms, I would like to also test you for flu  What should I do?  I have placed orders for these tests.   To schedule: go to your [facility] home page and scroll down to the section that says  You have an appointment that needs to be scheduled  and click the large green button that says  Schedule Now  and follow the steps to find the next available openings.     If you are unable to complete these [facility] scheduling steps, please call [phone] to schedule your testing.     These guidelines are for isolating before returning to work, school or .   For employers, schools and day cares: This is an official notice for this person s medical guidelines for returning in-person.   For health care sites: The [facility] gives different isolation and quarantine guidelines for healthcare sites, please check with these sites before arriving.     How do I self-isolate?  You isolate when you have symptoms of COVID or a test shows you have COVID, even if you don t have symptoms.   If you DO have symptoms:  Stay home and away from others  For at least 5 days after your symptoms started, AND   You are fever free for 24 hours (with no medicine that reduces fever), AND  Your other symptoms are better.  Wear a mask for 10 full days any time you are around others.  If you DON T have symptoms:  Stay at home and away from others for at least 5 days after your positive test.  Wear a mask for 10 full days any time you are around others.    How can I take care of myself?  Over the counter medications may help with your symptoms such as runny or stuffy nose, cough, chills, or fever. Talk to your care team about your options.     Some people are at high risk of severe illness (for example,  you have a weak immune system, you re 65 years or older, or you have certain medical problems). If your risk is high and your symptoms started in the last 5 to 7 days, we strongly recommend for you to get COVID treatment as soon as possible. Paxlovid, Molnupiravir and the monoclonal antibody treatments are proven safe and effective, make you feel better faster, and prevent hospitalization and death.       To schedule an appointment to discuss COVID treatment, request an appointment on [facility] (select  COVID-19 Treatment ) or call [facility] ([phone]), press 7.    Get lots of rest. Drink extra fluids (unless a doctor has told you not to)  Take Tylenol (acetaminophen) or ibuprofen for fever or pain. If you have liver or kidney problems, ask your family doctor if it's okay to take Tylenol or ibuprofen  Take over the counter medications for your symptoms, as directed by your doctor. You may also talk to your pharmacist.    If you have other health problems (like cancer, heart failure, an organ transplant or severe kidney disease): Call your specialty clinic if you don't feel better in the next 2 days.  Know when to call 911. Emergency warning signs include:  Trouble breathing or shortness of breath  Pain or pressure in the chest that doesn't go away  Feeling confused like you haven't felt before, or not being able to wake up  Bluish-colored lips or face    Where can I get more information?  [facility] - About COVID-19: [email or web address]   CDC - What to Do If You're Sick: https://www.cdc.gov/coronavirus/2019-ncov/if-you-are-sick/index.html   CDC - Quarantine & Isolation: https://www.cdc.gov/coronavirus/2019-ncov/your-health/quarantine-isolation.html   [facility] clinical trials (COVID-19 research studies): [email or web address]/umn-clinical-trials  Below are the COVID-19 hotlines at the [place] Department of [facility] ([facility]). Interpreters are available.  For health questions:  Call 227-031-2091 or 1-372.706.5261 (7 a.m. to 7 p.m.)  For questions about schools and childcare: Call 269-239-1715 or 1-867.388.2794 (7 a.m. to 7 p.m.)

## 2023-02-09 LAB — GROUP A STREP BY PCR: NOT DETECTED

## 2023-03-26 DIAGNOSIS — F33.1 MAJOR DEPRESSIVE DISORDER, RECURRENT EPISODE, MODERATE (H): ICD-10-CM

## 2023-03-26 DIAGNOSIS — I10 ESSENTIAL HYPERTENSION: ICD-10-CM

## 2023-03-27 RX ORDER — LOSARTAN POTASSIUM 50 MG/1
TABLET ORAL
Qty: 180 TABLET | Refills: 1 | OUTPATIENT
Start: 2023-03-27

## 2023-03-27 RX ORDER — SERTRALINE HYDROCHLORIDE 100 MG/1
TABLET, FILM COATED ORAL
Qty: 90 TABLET | Refills: 1 | OUTPATIENT
Start: 2023-03-27

## 2023-06-03 ENCOUNTER — HEALTH MAINTENANCE LETTER (OUTPATIENT)
Age: 53
End: 2023-06-03

## 2023-07-14 DIAGNOSIS — I10 ESSENTIAL HYPERTENSION: ICD-10-CM

## 2023-07-14 NOTE — LETTER
[facility]  [address] [address]  [phone]        July 18, 2023    Minoo Villavicencio  [address] [facility] [address]              Dear Minoo Villavicencio    This is to remind your medication follow up is due.    You may call our office at [phone] to schedule an appointment.    Please disregard this notice if you have already made an appointment.        Sincerely,          Dr. Licea's Care Team   [facility]   TRISTIAN Tomlinson

## 2023-07-14 NOTE — TELEPHONE ENCOUNTER
"Routing refill request to provider for review/approval because:  BP does not meet RN protocol parameters       Requested Prescriptions   Pending Prescriptions Disp Refills    hydrochlorothiazide (HYDRODIURIL) 25 MG tablet [Pharmacy Med Name: HYDROCHLOROTHIAZIDE 25 MG TAB] 90 tablet 1     Sig: TAKE 1 TABLET BY MOUTH EVERY DAY       Diuretics (Including Combos) Protocol Failed - 7/14/2023 12:39 AM        Failed - Blood pressure under 140/90 in past 12 months     BP Readings from Last 3 Encounters:   12/05/22 (!) 157/87   08/22/22 118/84   05/31/22 136/82                 Passed - Recent (12 mo) or future (30 days) visit within the authorizing provider's specialty     Patient has had an office visit with the authorizing provider or a provider within the authorizing providers department within the previous 12 mos or has a future within next 30 days. See \"Patient Info\" tab in inbasket, or \"Choose Columns\" in Meds & Orders section of the refill encounter.              Passed - Medication is active on med list        Passed - Patient is age 18 or older        Passed - No active pregancy on record        Passed - Normal serum creatinine on file in past 12 months     Recent Labs   Lab Test 08/22/22  1643   CR 0.67              Passed - Normal serum potassium on file in past 12 months     Recent Labs   Lab Test 08/22/22  1643   POTASSIUM 3.8                    Passed - Normal serum sodium on file in past 12 months     Recent Labs   Lab Test 08/22/22  1643                 Passed - No positive pregnancy test in past 12 months                 Kevin Le RN 07/14/23 7:58 AM   "

## 2023-07-17 RX ORDER — HYDROCHLOROTHIAZIDE 25 MG/1
TABLET ORAL
Qty: 90 TABLET | Refills: 0 | Status: SHIPPED | OUTPATIENT
Start: 2023-07-17 | End: 2023-09-26

## 2023-07-17 NOTE — TELEPHONE ENCOUNTER
I haven't seen her since 5/2022.   Please ask her to schedule annual physical and med refill appointment    Please see if she has home cuff to document improved blood pressure      I will fill 3 months only    LARA Licea MD       BP Readings from Last 6 Encounters:   12/05/22 (!) 157/87   08/22/22 118/84   05/31/22 136/82   01/07/22 126/76   07/27/21 112/86   03/04/21 134/82

## 2023-07-18 NOTE — TELEPHONE ENCOUNTER
Called, no answer. Left message for patient to return call.     Will send letter to advise pt. Follow up visit is overdue

## 2023-09-26 ENCOUNTER — VIRTUAL VISIT (OUTPATIENT)
Dept: FAMILY MEDICINE | Facility: CLINIC | Age: 53
End: 2023-09-26
Payer: COMMERCIAL

## 2023-09-26 DIAGNOSIS — Z13.220 LIPID SCREENING: ICD-10-CM

## 2023-09-26 DIAGNOSIS — I10 ESSENTIAL HYPERTENSION: Primary | ICD-10-CM

## 2023-09-26 DIAGNOSIS — Z97.5 IUD (INTRAUTERINE DEVICE) IN PLACE: ICD-10-CM

## 2023-09-26 DIAGNOSIS — N95.1 MENOPAUSAL SYNDROME (HOT FLASHES): ICD-10-CM

## 2023-09-26 DIAGNOSIS — F33.1 MAJOR DEPRESSIVE DISORDER, RECURRENT EPISODE, MODERATE (H): ICD-10-CM

## 2023-09-26 DIAGNOSIS — Z12.31 VISIT FOR SCREENING MAMMOGRAM: ICD-10-CM

## 2023-09-26 PROCEDURE — 99214 OFFICE O/P EST MOD 30 MIN: CPT | Mod: VID | Performed by: FAMILY MEDICINE

## 2023-09-26 RX ORDER — HYDROCHLOROTHIAZIDE 25 MG/1
25 TABLET ORAL DAILY
Qty: 90 TABLET | Refills: 3 | Status: SHIPPED | OUTPATIENT
Start: 2023-09-26 | End: 2024-04-12

## 2023-09-26 RX ORDER — SERTRALINE HYDROCHLORIDE 100 MG/1
100 TABLET, FILM COATED ORAL DAILY
Qty: 90 TABLET | Refills: 3 | Status: SHIPPED | OUTPATIENT
Start: 2023-09-26 | End: 2024-04-12

## 2023-09-26 RX ORDER — LOSARTAN POTASSIUM 50 MG/1
100 TABLET ORAL DAILY
Qty: 60 TABLET | Refills: 0 | Status: CANCELLED | OUTPATIENT
Start: 2023-09-26

## 2023-09-26 RX ORDER — LOSARTAN POTASSIUM 100 MG/1
100 TABLET ORAL DAILY
Qty: 90 TABLET | Refills: 1 | Status: SHIPPED | OUTPATIENT
Start: 2023-09-26 | End: 2024-03-27

## 2023-09-26 ASSESSMENT — ANXIETY QUESTIONNAIRES
GAD7 TOTAL SCORE: 1
6. BECOMING EASILY ANNOYED OR IRRITABLE: NOT AT ALL
3. WORRYING TOO MUCH ABOUT DIFFERENT THINGS: SEVERAL DAYS
2. NOT BEING ABLE TO STOP OR CONTROL WORRYING: NOT AT ALL
7. FEELING AFRAID AS IF SOMETHING AWFUL MIGHT HAPPEN: NOT AT ALL
IF YOU CHECKED OFF ANY PROBLEMS ON THIS QUESTIONNAIRE, HOW DIFFICULT HAVE THESE PROBLEMS MADE IT FOR YOU TO DO YOUR WORK, TAKE CARE OF THINGS AT HOME, OR GET ALONG WITH OTHER PEOPLE: NOT DIFFICULT AT ALL
5. BEING SO RESTLESS THAT IT IS HARD TO SIT STILL: NOT AT ALL
GAD7 TOTAL SCORE: 1
1. FEELING NERVOUS, ANXIOUS, OR ON EDGE: NOT AT ALL

## 2023-09-26 ASSESSMENT — PATIENT HEALTH QUESTIONNAIRE - PHQ9
SUM OF ALL RESPONSES TO PHQ QUESTIONS 1-9: 2
5. POOR APPETITE OR OVEREATING: NOT AT ALL

## 2023-09-26 NOTE — PROGRESS NOTES
"Minoo is a 53 year old who is being evaluated via a billable video visit.      How would you like to obtain your AVS? MyChart  If the video visit is dropped, the invitation should be resent by: Text to cell phone: [phone]  Will anyone else be joining your video visit? No          Assessment & Plan     (I10) Essential hypertension  (primary encounter diagnosis)  Comment: increase losartan to 100mg. Continue hydrochlorothiazide at 25mg. Check lytes and blood pressure in two weeks. The patient indicates understanding of these issues and agrees with the plan.   Plan: hydrochlorothiazide (HYDRODIURIL) 25 MG tablet,        losartan (COZAAR) 100 MG tablet, Basic         metabolic panel  (Ca, Cl, CO2, Creat, Gluc, K,         Na, BUN), PRIMARY CARE FOLLOW-UP SCHEDULING,         PRIMARY CARE FOLLOW-UP SCHEDULING            (F33.1) Major depressive disorder, recurrent episode, moderate (H)  Comment: doing well on this med. Prescription refilled   Plan: sertraline (ZOLOFT) 100 MG tablet            (Z12.31) Visit for screening mammogram  Comment: discussed   Plan: MA SCREENING DIGITAL BILAT - Future  (s+30)            (Z13.220) Lipid screening  Comment: discussed   Plan: Lipid panel reflex to direct LDL Fasting            (N95.1) Menopausal syndrome (hot flashes)  Comment: likely not through menopause. Labs in for her   Plan: Luteinizing Hormone, Follicle stimulating         hormone            (Z97.5) IUD (intrauterine device) in place  Comment:   Plan: Luteinizing Hormone, Follicle stimulating         hormone             BMI:   Estimated body mass index is 27.57 kg/m  as calculated from the following:    Height as of 12/5/22: 1.702 m (5' 7\").    Weight as of 12/5/22: 79.8 kg (176 lb).       Work on weight loss  Regular exercise    Toya Licea MD  [facility]    Subjective   Minoo is a 53 year old, presenting for the following health issues:  Hypertension and Depression        9/26/2023     2:00 " PM   Additional Questions   Roomed by TRISTIAN Tomlinson   Accompanied by self       HPI       Hypertension Follow-up  Today BP was 155/108.  On average BP will run 130-150/   Do you check your blood pressure regularly outside of the clinic? Yes   Are you following a low salt diet? No  Are your blood pressures ever more than 140 on the top number (systolic) OR more   than 90 on the bottom number (diastolic), for example 140/90? Yes      Last visit with her was 5/2022  : HYPERTENSION  Currently taking hydrochlorothiazide 25  Losartan 50  Ran out of meds so off them today      Running 140-150s at home     BP Readings from Last 6 Encounters:   12/05/22 (!) 157/87   08/22/22 118/84   05/31/22 136/82   01/07/22 126/76   07/27/21 112/86   03/04/21 134/82         Depression Followup  How are you doing with your depression since your last visit? Improved   Are you having other symptoms that might be associated with depression? No  Have you had a significant life event?  No   Are you feeling anxious or having panic attacks?   No  Do you have any concerns with your use of alcohol or other drugs? No  Sertraline is working for her    Has an iud in place - no periods so can't tell if they have changed   Wondering if she is through menopause   Having some hot flashes    The 10-year ASCVD risk score (Suzanne BAH, et al., 2019) is: 3%    Values used to calculate the score:      Age: 53 years      Sex: Female      Is Non- : No      Diabetic: No      Tobacco smoker: No      Systolic Blood Pressure: 157 mmHg      Is BP treated: Yes      HDL Cholesterol: 62 mg/dL      Total Cholesterol: 225 mg/dL      Social History     Tobacco Use    Smoking status: Never    Smokeless tobacco: Never   Vaping Use    Vaping Use: Never used   Substance Use Topics    Alcohol use: Yes    Drug use: Never         5/31/2022     7:51 AM 2/14/2023     3:19 PM 9/26/2023     2:05 PM   PHQ   PHQ-9 Total Score 5 2 2   Q9: Thoughts of better  off dead/self-harm past 2 weeks Not at all Not at all Not at all         4/26/2022     3:51 PM 2/14/2023     3:19 PM 9/26/2023     2:05 PM   ASHISH-7 SCORE   Total Score 15 (severe anxiety)     Total Score 15 0 1         9/26/2023     2:05 PM   Last PHQ-9   1.  Little interest or pleasure in doing things 0   2.  Feeling down, depressed, or hopeless 0   3.  Trouble falling or staying asleep, or sleeping too much 1   4.  Feeling tired or having little energy 1   5.  Poor appetite or overeating 0   6.  Feeling bad about yourself 0   7.  Trouble concentrating 0   8.  Moving slowly or restless 0   Q9: Thoughts of better off dead/self-harm past 2 weeks 0   PHQ-9 Total Score 2   Difficulty at work, home, or with people Not difficult at all         Review of Systems   Constitutional, HEENT, cardiovascular, pulmonary, gi and gu systems are negative, except as otherwise noted.      Objective    Vitals - Patient Reported  Systolic (Patient Reported): (!) 155  Diastolic (Patient Reported): (!) 108  - not on her meds today       Vitals:  No vitals were obtained today due to virtual visit.    Physical Exam   GENERAL: Healthy, alert and no distress  EYES: Eyes grossly normal to inspection.  No discharge or erythema, or obvious scleral/conjunctival abnormalities.  RESP: No audible wheeze, cough, or visible cyanosis.  No visible retractions or increased work of breathing.    SKIN: Visible skin clear. No significant rash, abnormal pigmentation or lesions.  NEURO: Cranial nerves grossly intact.  Mentation and speech appropriate for age.  PSYCH: Mentation appears normal, affect normal/bright, judgement and insight intact, normal speech and appearance well-groomed.          Video-Visit Details    Type of service:  Video Visit     Originating Location (pt. Location): Home    Distant Location (provider location):  On-site  Platform used for Video Visit: Berenice

## 2023-10-26 ENCOUNTER — LAB (OUTPATIENT)
Dept: LAB | Facility: CLINIC | Age: 53
End: 2023-10-26
Attending: FAMILY MEDICINE
Payer: COMMERCIAL

## 2023-10-26 DIAGNOSIS — N95.1 MENOPAUSAL SYNDROME (HOT FLASHES): ICD-10-CM

## 2023-10-26 DIAGNOSIS — Z97.5 IUD (INTRAUTERINE DEVICE) IN PLACE: ICD-10-CM

## 2023-10-26 DIAGNOSIS — Z13.220 LIPID SCREENING: ICD-10-CM

## 2023-10-26 DIAGNOSIS — I10 ESSENTIAL HYPERTENSION: ICD-10-CM

## 2023-10-26 LAB
ANION GAP SERPL CALCULATED.3IONS-SCNC: 10 MMOL/L (ref 7–15)
BUN SERPL-MCNC: 25.7 MG/DL (ref 6–20)
CALCIUM SERPL-MCNC: 10.4 MG/DL (ref 8.6–10)
CHLORIDE SERPL-SCNC: 103 MMOL/L (ref 98–107)
CHOLEST SERPL-MCNC: 311 MG/DL
CREAT SERPL-MCNC: 0.82 MG/DL (ref 0.51–0.95)
DEPRECATED HCO3 PLAS-SCNC: 27 MMOL/L (ref 22–29)
EGFRCR SERPLBLD CKD-EPI 2021: 86 ML/MIN/1.73M2
FSH SERPL IRP2-ACNC: 88.3 MIU/ML
GLUCOSE SERPL-MCNC: 101 MG/DL (ref 70–99)
HDLC SERPL-MCNC: 72 MG/DL
LDLC SERPL CALC-MCNC: 215 MG/DL
LH SERPL-ACNC: 55.5 MIU/ML
NONHDLC SERPL-MCNC: 239 MG/DL
POTASSIUM SERPL-SCNC: 4.1 MMOL/L (ref 3.4–5.3)
SODIUM SERPL-SCNC: 140 MMOL/L (ref 135–145)
TRIGL SERPL-MCNC: 120 MG/DL

## 2023-10-26 PROCEDURE — 83002 ASSAY OF GONADOTROPIN (LH): CPT

## 2023-10-26 PROCEDURE — 83001 ASSAY OF GONADOTROPIN (FSH): CPT

## 2023-10-26 PROCEDURE — 80048 BASIC METABOLIC PNL TOTAL CA: CPT

## 2023-10-26 PROCEDURE — 80061 LIPID PANEL: CPT

## 2023-10-26 PROCEDURE — 36415 COLL VENOUS BLD VENIPUNCTURE: CPT

## 2024-03-05 ENCOUNTER — MYC MEDICAL ADVICE (OUTPATIENT)
Dept: FAMILY MEDICINE | Facility: CLINIC | Age: 54
End: 2024-03-05
Payer: COMMERCIAL

## 2024-03-05 NOTE — LETTER
March 25, 2024      Minoo Villavicencio  [address] [facility] [address]        Dear Minoo,     You may be due to follow up in regards to your blood pressure in clinic.   Please contact the clinic to schedule this appointment.      Thank you,        Dr. Licea's Care Team   [facility]            [General Appearance - Well Developed] : well developed [General Appearance - Well Nourished] : well nourished [General Appearance - In No Acute Distress] : no acute distress [Normal Conjunctiva] : the conjunctiva exhibited no abnormalities [Erythema] : erythema of the pharynx [] : the neck was supple [Jugular Venous Distention Increased] : there was no jugular-venous distention [Heart Sounds] : normal S1 and S2 [Murmurs] : no murmurs present [Respiration, Rhythm And Depth] : normal respiratory rhythm and effort [Auscultation Breath Sounds / Voice Sounds] : lungs were clear to auscultation bilaterally [Abdomen Soft] : soft [Abnormal Walk] : normal gait [Nail Clubbing] : no clubbing of the fingernails [Cyanosis, Localized] : no localized cyanosis [Non-Pitting] : non-pitting [Cranial Nerves] : cranial nerves 2-12 were intact [FreeTextEntry1] : Multiple tattoos + malar rash, redness

## 2024-03-05 NOTE — TELEPHONE ENCOUNTER
Patient Quality Outreach    Patient is due for the following:   Hypertension -  BP check    Health Maintenance Due   Topic Date Due    ADVANCE CARE PLANNING  Never done    HEPATITIS B IMMUNIZATION (1 of 3 - 19+ 3-dose series) Never done    YEARLY PREVENTIVE VISIT  03/04/2022    ZOSTER IMMUNIZATION (2 of 2) 07/26/2022    DTAP/TDAP/TD IMMUNIZATION (3 - Td or Tdap) 08/13/2022    MAMMO SCREENING  09/03/2022    INFLUENZA VACCINE (1) 09/01/2023    COVID-19 Vaccine (5 - 2023-24 season) 09/01/2023    PHQ-9  03/26/2024         Next Steps:   Schedule a office visit for BP  must be in person with provider.     Type of outreach:    Sent [employer] message.      Questions for provider review:    None           Bushra Meyer MA  Chart routed to Care Team.

## 2024-03-28 ENCOUNTER — ALLIED HEALTH/NURSE VISIT (OUTPATIENT)
Dept: FAMILY MEDICINE | Facility: CLINIC | Age: 54
End: 2024-03-28
Payer: COMMERCIAL

## 2024-03-28 ENCOUNTER — LAB (OUTPATIENT)
Dept: LAB | Facility: CLINIC | Age: 54
End: 2024-03-28
Payer: COMMERCIAL

## 2024-03-28 VITALS — SYSTOLIC BLOOD PRESSURE: 148 MMHG | DIASTOLIC BLOOD PRESSURE: 98 MMHG

## 2024-03-28 DIAGNOSIS — I10 ESSENTIAL HYPERTENSION: ICD-10-CM

## 2024-03-28 DIAGNOSIS — Z01.30 BLOOD PRESSURE CHECK: Primary | ICD-10-CM

## 2024-03-28 LAB
ANION GAP SERPL CALCULATED.3IONS-SCNC: 10 MMOL/L (ref 7–15)
BUN SERPL-MCNC: 14.4 MG/DL (ref 6–20)
CALCIUM SERPL-MCNC: 10.3 MG/DL (ref 8.6–10)
CHLORIDE SERPL-SCNC: 103 MMOL/L (ref 98–107)
CREAT SERPL-MCNC: 0.77 MG/DL (ref 0.51–0.95)
DEPRECATED HCO3 PLAS-SCNC: 29 MMOL/L (ref 22–29)
EGFRCR SERPLBLD CKD-EPI 2021: >90 ML/MIN/1.73M2
GLUCOSE SERPL-MCNC: 114 MG/DL (ref 70–99)
POTASSIUM SERPL-SCNC: 3.8 MMOL/L (ref 3.4–5.3)
SODIUM SERPL-SCNC: 142 MMOL/L (ref 135–145)

## 2024-03-28 PROCEDURE — 36415 COLL VENOUS BLD VENIPUNCTURE: CPT

## 2024-03-28 PROCEDURE — 80048 BASIC METABOLIC PNL TOTAL CA: CPT

## 2024-03-28 PROCEDURE — 99207 PR NO CHARGE NURSE ONLY: CPT

## 2024-03-28 NOTE — PROGRESS NOTES
Patient here today for a blood pressure check. She takes her BP medication every evening. Her blood pressure was 148/96 and 148/98. She is not feeling dizzy or lightheaded. I did help her schedule a physical and blood pressure follow up with you on 04/12/2024. Provider ZARA Martinez CMA

## 2024-04-11 SDOH — HEALTH STABILITY: PHYSICAL HEALTH: ON AVERAGE, HOW MANY MINUTES DO YOU ENGAGE IN EXERCISE AT THIS LEVEL?: 20 MIN

## 2024-04-11 SDOH — HEALTH STABILITY: PHYSICAL HEALTH: ON AVERAGE, HOW MANY DAYS PER WEEK DO YOU ENGAGE IN MODERATE TO STRENUOUS EXERCISE (LIKE A BRISK WALK)?: 2 DAYS

## 2024-04-11 ASSESSMENT — PATIENT HEALTH QUESTIONNAIRE - PHQ9
10. IF YOU CHECKED OFF ANY PROBLEMS, HOW DIFFICULT HAVE THESE PROBLEMS MADE IT FOR YOU TO DO YOUR WORK, TAKE CARE OF THINGS AT HOME, OR GET ALONG WITH OTHER PEOPLE: SOMEWHAT DIFFICULT
SUM OF ALL RESPONSES TO PHQ QUESTIONS 1-9: 5
SUM OF ALL RESPONSES TO PHQ QUESTIONS 1-9: 5

## 2024-04-11 ASSESSMENT — SOCIAL DETERMINANTS OF HEALTH (SDOH): HOW OFTEN DO YOU GET TOGETHER WITH FRIENDS OR RELATIVES?: ONCE A WEEK

## 2024-04-12 ENCOUNTER — OFFICE VISIT (OUTPATIENT)
Dept: FAMILY MEDICINE | Facility: CLINIC | Age: 54
End: 2024-04-12
Payer: COMMERCIAL

## 2024-04-12 VITALS
WEIGHT: 198.6 LBS | OXYGEN SATURATION: 96 % | HEART RATE: 85 BPM | HEIGHT: 66 IN | DIASTOLIC BLOOD PRESSURE: 86 MMHG | TEMPERATURE: 98.3 F | SYSTOLIC BLOOD PRESSURE: 138 MMHG | BODY MASS INDEX: 31.92 KG/M2

## 2024-04-12 DIAGNOSIS — Z12.31 ENCOUNTER FOR SCREENING MAMMOGRAM FOR BREAST CANCER: ICD-10-CM

## 2024-04-12 DIAGNOSIS — Z97.5 IUD (INTRAUTERINE DEVICE) IN PLACE: ICD-10-CM

## 2024-04-12 DIAGNOSIS — I10 ESSENTIAL HYPERTENSION: ICD-10-CM

## 2024-04-12 DIAGNOSIS — Z00.01 ENCOUNTER FOR GENERAL ADULT MEDICAL EXAMINATION WITH ABNORMAL FINDINGS: Primary | ICD-10-CM

## 2024-04-12 DIAGNOSIS — Z13.220 LIPID SCREENING: ICD-10-CM

## 2024-04-12 DIAGNOSIS — F33.1 MAJOR DEPRESSIVE DISORDER, RECURRENT EPISODE, MODERATE (H): ICD-10-CM

## 2024-04-12 DIAGNOSIS — E66.09 CLASS 1 OBESITY DUE TO EXCESS CALORIES WITHOUT SERIOUS COMORBIDITY WITH BODY MASS INDEX (BMI) OF 32.0 TO 32.9 IN ADULT: ICD-10-CM

## 2024-04-12 DIAGNOSIS — E66.811 CLASS 1 OBESITY DUE TO EXCESS CALORIES WITHOUT SERIOUS COMORBIDITY WITH BODY MASS INDEX (BMI) OF 32.0 TO 32.9 IN ADULT: ICD-10-CM

## 2024-04-12 LAB
ANION GAP SERPL CALCULATED.3IONS-SCNC: 11 MMOL/L (ref 7–15)
BUN SERPL-MCNC: 21.4 MG/DL (ref 6–20)
CALCIUM SERPL-MCNC: 10 MG/DL (ref 8.6–10)
CHLORIDE SERPL-SCNC: 103 MMOL/L (ref 98–107)
CHOLEST SERPL-MCNC: 299 MG/DL
CREAT SERPL-MCNC: 0.74 MG/DL (ref 0.51–0.95)
DEPRECATED HCO3 PLAS-SCNC: 26 MMOL/L (ref 22–29)
EGFRCR SERPLBLD CKD-EPI 2021: >90 ML/MIN/1.73M2
FASTING STATUS PATIENT QL REPORTED: NO
FSH SERPL IRP2-ACNC: 96.6 MIU/ML
GLUCOSE SERPL-MCNC: 101 MG/DL (ref 70–99)
HDLC SERPL-MCNC: 73 MG/DL
LDLC SERPL CALC-MCNC: 202 MG/DL
LH SERPL-ACNC: 64.2 MIU/ML
NONHDLC SERPL-MCNC: 226 MG/DL
POTASSIUM SERPL-SCNC: 4 MMOL/L (ref 3.4–5.3)
SODIUM SERPL-SCNC: 140 MMOL/L (ref 135–145)
TRIGL SERPL-MCNC: 120 MG/DL

## 2024-04-12 PROCEDURE — 96127 BRIEF EMOTIONAL/BEHAV ASSMT: CPT | Performed by: FAMILY MEDICINE

## 2024-04-12 PROCEDURE — 80048 BASIC METABOLIC PNL TOTAL CA: CPT | Performed by: FAMILY MEDICINE

## 2024-04-12 PROCEDURE — 83001 ASSAY OF GONADOTROPIN (FSH): CPT | Performed by: FAMILY MEDICINE

## 2024-04-12 PROCEDURE — 99396 PREV VISIT EST AGE 40-64: CPT | Mod: 25 | Performed by: FAMILY MEDICINE

## 2024-04-12 PROCEDURE — 90471 IMMUNIZATION ADMIN: CPT | Performed by: FAMILY MEDICINE

## 2024-04-12 PROCEDURE — 80061 LIPID PANEL: CPT | Performed by: FAMILY MEDICINE

## 2024-04-12 PROCEDURE — 99213 OFFICE O/P EST LOW 20 MIN: CPT | Mod: 25 | Performed by: FAMILY MEDICINE

## 2024-04-12 PROCEDURE — 36415 COLL VENOUS BLD VENIPUNCTURE: CPT | Performed by: FAMILY MEDICINE

## 2024-04-12 PROCEDURE — 90750 HZV VACC RECOMBINANT IM: CPT | Performed by: FAMILY MEDICINE

## 2024-04-12 PROCEDURE — 90715 TDAP VACCINE 7 YRS/> IM: CPT | Performed by: FAMILY MEDICINE

## 2024-04-12 PROCEDURE — 90472 IMMUNIZATION ADMIN EACH ADD: CPT | Performed by: FAMILY MEDICINE

## 2024-04-12 PROCEDURE — 83002 ASSAY OF GONADOTROPIN (LH): CPT | Performed by: FAMILY MEDICINE

## 2024-04-12 RX ORDER — SERTRALINE HYDROCHLORIDE 100 MG/1
100 TABLET, FILM COATED ORAL DAILY
Qty: 90 TABLET | Refills: 3 | Status: SHIPPED | OUTPATIENT
Start: 2024-04-12

## 2024-04-12 RX ORDER — HYDROCHLOROTHIAZIDE 25 MG/1
25 TABLET ORAL DAILY
Qty: 90 TABLET | Refills: 3 | Status: SHIPPED | OUTPATIENT
Start: 2024-04-12

## 2024-04-12 RX ORDER — LOSARTAN POTASSIUM 100 MG/1
100 TABLET ORAL DAILY
Qty: 90 TABLET | Refills: 3 | Status: SHIPPED | OUTPATIENT
Start: 2024-04-12

## 2024-04-12 RX ORDER — AMLODIPINE BESYLATE 10 MG/1
10 TABLET ORAL DAILY
Qty: 90 TABLET | Refills: 1 | Status: SHIPPED | OUTPATIENT
Start: 2024-04-12

## 2024-04-12 ASSESSMENT — ANXIETY QUESTIONNAIRES
IF YOU CHECKED OFF ANY PROBLEMS ON THIS QUESTIONNAIRE, HOW DIFFICULT HAVE THESE PROBLEMS MADE IT FOR YOU TO DO YOUR WORK, TAKE CARE OF THINGS AT HOME, OR GET ALONG WITH OTHER PEOPLE: NOT DIFFICULT AT ALL
7. FEELING AFRAID AS IF SOMETHING AWFUL MIGHT HAPPEN: NOT AT ALL
5. BEING SO RESTLESS THAT IT IS HARD TO SIT STILL: NOT AT ALL
6. BECOMING EASILY ANNOYED OR IRRITABLE: NOT AT ALL
1. FEELING NERVOUS, ANXIOUS, OR ON EDGE: NOT AT ALL
3. WORRYING TOO MUCH ABOUT DIFFERENT THINGS: SEVERAL DAYS
GAD7 TOTAL SCORE: 1
GAD7 TOTAL SCORE: 1
2. NOT BEING ABLE TO STOP OR CONTROL WORRYING: NOT AT ALL

## 2024-04-12 ASSESSMENT — PATIENT HEALTH QUESTIONNAIRE - PHQ9: 5. POOR APPETITE OR OVEREATING: NOT AT ALL

## 2024-04-12 NOTE — NURSING NOTE
Prior to immunization administration, verified patients identity using patient s name and date of birth. Please see Immunization Activity for additional information.     Screening Questionnaire for Adult Immunization    Are you sick today?   No   Do you have allergies to medications, food, a vaccine component or latex?   No   Have you ever had a serious reaction after receiving a vaccination?   No   Do you have a long-term health problem with heart, lung, kidney, or metabolic disease (e.g., diabetes), asthma, a blood disorder, no spleen, complement component deficiency, a cochlear implant, or a spinal fluid leak?  Are you on long-term aspirin therapy?   No   Do you have cancer, leukemia, HIV/AIDS, or any other immune system problem?   No   Do you have a parent, brother, or sister with an immune system problem?   No   In the past 3 months, have you taken medications that affect  your immune system, such as prednisone, other steroids, or anticancer drugs; drugs for the treatment of rheumatoid arthritis, Crohn s disease, or psoriasis; or have you had radiation treatments?   No   Have you had a seizure, or a brain or other nervous system problem?   No   During the past year, have you received a transfusion of blood or blood    products, or been given immune (gamma) globulin or antiviral drug?   No   For women: Are you pregnant or is there a chance you could become       pregnant during the next month?   No   Have you received any vaccinations in the past 4 weeks?   No     Immunization questionnaire answers were all negative.      Patient instructed to remain in clinic for 15 minutes afterwards, and to report any adverse reactions.     Screening performed by Bushra Meyer MA on 4/12/2024 at 11:48 AM.

## 2024-07-26 DIAGNOSIS — I10 ESSENTIAL HYPERTENSION: ICD-10-CM

## 2024-07-26 RX ORDER — AMLODIPINE BESYLATE 10 MG/1
10 TABLET ORAL DAILY
Qty: 90 TABLET | Refills: 1 | OUTPATIENT
Start: 2024-07-26

## 2024-10-19 DIAGNOSIS — I10 ESSENTIAL HYPERTENSION: ICD-10-CM

## 2024-10-28 NOTE — TELEPHONE ENCOUNTER
L/M for pt to call with BP Readings or to schedule an bp check with the CMA.   Hep B, adolescent or pediatric; 2021 02:20; James Pina (RN); [employer]; Cp23d (Exp. Date: 07-Dec-2023); IntraMuscular; Vastus Lateralis Left.; 0.5 milliLiter(s); VIS (VIS Published: 15-Aug-2019, VIS Presented: 2021);   RSV-MAb; 2021 14:26; Tammy Hooper); [employer] IntraMuscular; 48 milliGRAM(s); VIS (VIS Presented: 2021);   RSV-MAb; 07-Jan-2022 14:02; Roma Gregorio (RN); [employer] IntraMuscular; 76 milliGRAM(s); VIS (VIS Presented: 07-Jan-2022);

## 2024-10-29 RX ORDER — AMLODIPINE BESYLATE 10 MG/1
10 TABLET ORAL DAILY
Qty: 90 TABLET | Refills: 1 | OUTPATIENT
Start: 2024-10-29

## 2025-02-09 ENCOUNTER — HEALTH MAINTENANCE LETTER (OUTPATIENT)
Age: 55
End: 2025-02-09

## 2025-03-13 ENCOUNTER — PATIENT OUTREACH (OUTPATIENT)
Dept: CARE COORDINATION | Facility: CLINIC | Age: 55
End: 2025-03-13
Payer: COMMERCIAL

## 2025-04-20 DIAGNOSIS — I10 ESSENTIAL HYPERTENSION: ICD-10-CM

## 2025-04-21 RX ORDER — HYDROCHLOROTHIAZIDE 25 MG/1
25 TABLET ORAL DAILY
Qty: 90 TABLET | Refills: 0 | Status: SHIPPED | OUTPATIENT
Start: 2025-04-21

## 2025-04-21 RX ORDER — LOSARTAN POTASSIUM 100 MG/1
100 TABLET ORAL DAILY
Qty: 90 TABLET | Refills: 0 | Status: SHIPPED | OUTPATIENT
Start: 2025-04-21

## 2025-04-29 ENCOUNTER — OFFICE VISIT (OUTPATIENT)
Dept: FAMILY MEDICINE | Facility: CLINIC | Age: 55
End: 2025-04-29
Payer: COMMERCIAL

## 2025-04-29 VITALS
OXYGEN SATURATION: 96 % | WEIGHT: 207 LBS | RESPIRATION RATE: 16 BRPM | HEIGHT: 66 IN | DIASTOLIC BLOOD PRESSURE: 92 MMHG | TEMPERATURE: 97.4 F | BODY MASS INDEX: 33.27 KG/M2 | SYSTOLIC BLOOD PRESSURE: 142 MMHG | HEART RATE: 97 BPM

## 2025-04-29 DIAGNOSIS — I10 ESSENTIAL HYPERTENSION: ICD-10-CM

## 2025-04-29 DIAGNOSIS — Z13.6 SCREENING FOR CARDIOVASCULAR CONDITION: ICD-10-CM

## 2025-04-29 DIAGNOSIS — K21.9 GASTROESOPHAGEAL REFLUX DISEASE, UNSPECIFIED WHETHER ESOPHAGITIS PRESENT: ICD-10-CM

## 2025-04-29 DIAGNOSIS — Z00.01 ENCOUNTER FOR ROUTINE ADULT MEDICAL EXAM WITH ABNORMAL FINDINGS: Primary | ICD-10-CM

## 2025-04-29 DIAGNOSIS — Z12.31 VISIT FOR SCREENING MAMMOGRAM: ICD-10-CM

## 2025-04-29 DIAGNOSIS — F33.1 MAJOR DEPRESSIVE DISORDER, RECURRENT EPISODE, MODERATE (H): ICD-10-CM

## 2025-04-29 DIAGNOSIS — E66.811 CLASS 1 OBESITY DUE TO EXCESS CALORIES WITHOUT SERIOUS COMORBIDITY WITH BODY MASS INDEX (BMI) OF 33.0 TO 33.9 IN ADULT: ICD-10-CM

## 2025-04-29 DIAGNOSIS — E66.09 CLASS 1 OBESITY DUE TO EXCESS CALORIES WITHOUT SERIOUS COMORBIDITY WITH BODY MASS INDEX (BMI) OF 33.0 TO 33.9 IN ADULT: ICD-10-CM

## 2025-04-29 PROCEDURE — 3080F DIAST BP >= 90 MM HG: CPT | Performed by: FAMILY MEDICINE

## 2025-04-29 PROCEDURE — 96127 BRIEF EMOTIONAL/BEHAV ASSMT: CPT | Performed by: FAMILY MEDICINE

## 2025-04-29 PROCEDURE — 99214 OFFICE O/P EST MOD 30 MIN: CPT | Mod: 25 | Performed by: FAMILY MEDICINE

## 2025-04-29 PROCEDURE — 99396 PREV VISIT EST AGE 40-64: CPT | Mod: 25 | Performed by: FAMILY MEDICINE

## 2025-04-29 PROCEDURE — 3077F SYST BP >= 140 MM HG: CPT | Performed by: FAMILY MEDICINE

## 2025-04-29 PROCEDURE — 90471 IMMUNIZATION ADMIN: CPT | Performed by: FAMILY MEDICINE

## 2025-04-29 PROCEDURE — 90677 PCV20 VACCINE IM: CPT | Performed by: FAMILY MEDICINE

## 2025-04-29 RX ORDER — SERTRALINE HYDROCHLORIDE 100 MG/1
100 TABLET, FILM COATED ORAL DAILY
Qty: 90 TABLET | Refills: 3 | Status: SHIPPED | OUTPATIENT
Start: 2025-04-29

## 2025-04-29 RX ORDER — HYDROCHLOROTHIAZIDE 25 MG/1
25 TABLET ORAL DAILY
Qty: 90 TABLET | Refills: 3 | Status: SHIPPED | OUTPATIENT
Start: 2025-04-29

## 2025-04-29 RX ORDER — LOSARTAN POTASSIUM 100 MG/1
100 TABLET ORAL DAILY
Qty: 90 TABLET | Refills: 3 | Status: SHIPPED | OUTPATIENT
Start: 2025-04-29

## 2025-04-29 RX ORDER — AMLODIPINE BESYLATE 10 MG/1
10 TABLET ORAL DAILY
Qty: 90 TABLET | Refills: 3 | Status: SHIPPED | OUTPATIENT
Start: 2025-04-29

## 2025-04-29 SDOH — HEALTH STABILITY: PHYSICAL HEALTH: ON AVERAGE, HOW MANY MINUTES DO YOU ENGAGE IN EXERCISE AT THIS LEVEL?: 20 MIN

## 2025-04-29 SDOH — HEALTH STABILITY: PHYSICAL HEALTH: ON AVERAGE, HOW MANY DAYS PER WEEK DO YOU ENGAGE IN MODERATE TO STRENUOUS EXERCISE (LIKE A BRISK WALK)?: 1 DAY

## 2025-04-29 ASSESSMENT — PATIENT HEALTH QUESTIONNAIRE - PHQ9
SUM OF ALL RESPONSES TO PHQ QUESTIONS 1-9: 4
10. IF YOU CHECKED OFF ANY PROBLEMS, HOW DIFFICULT HAVE THESE PROBLEMS MADE IT FOR YOU TO DO YOUR WORK, TAKE CARE OF THINGS AT HOME, OR GET ALONG WITH OTHER PEOPLE: NOT DIFFICULT AT ALL
SUM OF ALL RESPONSES TO PHQ QUESTIONS 1-9: 4

## 2025-04-29 ASSESSMENT — ANXIETY QUESTIONNAIRES
7. FEELING AFRAID AS IF SOMETHING AWFUL MIGHT HAPPEN: NOT AT ALL
3. WORRYING TOO MUCH ABOUT DIFFERENT THINGS: NOT AT ALL
6. BECOMING EASILY ANNOYED OR IRRITABLE: NOT AT ALL
GAD7 TOTAL SCORE: 0
IF YOU CHECKED OFF ANY PROBLEMS ON THIS QUESTIONNAIRE, HOW DIFFICULT HAVE THESE PROBLEMS MADE IT FOR YOU TO DO YOUR WORK, TAKE CARE OF THINGS AT HOME, OR GET ALONG WITH OTHER PEOPLE: NOT DIFFICULT AT ALL
GAD7 TOTAL SCORE: 0
5. BEING SO RESTLESS THAT IT IS HARD TO SIT STILL: NOT AT ALL
GAD7 TOTAL SCORE: 0
7. FEELING AFRAID AS IF SOMETHING AWFUL MIGHT HAPPEN: NOT AT ALL
2. NOT BEING ABLE TO STOP OR CONTROL WORRYING: NOT AT ALL
4. TROUBLE RELAXING: NOT AT ALL
1. FEELING NERVOUS, ANXIOUS, OR ON EDGE: NOT AT ALL
8. IF YOU CHECKED OFF ANY PROBLEMS, HOW DIFFICULT HAVE THESE MADE IT FOR YOU TO DO YOUR WORK, TAKE CARE OF THINGS AT HOME, OR GET ALONG WITH OTHER PEOPLE?: NOT DIFFICULT AT ALL

## 2025-04-29 ASSESSMENT — SOCIAL DETERMINANTS OF HEALTH (SDOH): HOW OFTEN DO YOU GET TOGETHER WITH FRIENDS OR RELATIVES?: ONCE A WEEK

## 2025-04-29 NOTE — PROGRESS NOTES
"Preventive Care Visit  [facility] LINNEA Licea MD, Family Medicine  Apr 29, 2025      Assessment & Plan     (Z00.01) Encounter for routine adult medical exam with abnormal findings  (primary encounter diagnosis)  Comment: We discussed self breast exams, exercise 30mins/day, and calcium with vitamin D at 1200mg/day, preferably from dietary sources.  Diet, Weight loss, and Exercise were discussed as well.       (Z12.31) Visit for screening mammogram  Comment: discussed   Plan: MA Screening Bilateral w/ Tip            (I10) Essential hypertension  Comment: restart amlodipine. Send in blood pressure data in a couple weeks   Plan: BASIC METABOLIC PANEL, amLODIPine (NORVASC) 10         MG tablet, hydrochlorothiazide (HYDRODIURIL) 25        MG tablet, losartan (COZAAR) 100 MG tablet            (Z13.6) Screening for cardiovascular condition  Comment: discussed   Plan: Lipid panel reflex to direct LDL Fasting            (F33.1) Major depressive disorder, recurrent episode, moderate (H)  Comment: med is working. Refilled   Plan: sertraline (ZOLOFT) 100 MG tablet            (K21.9) Gastroesophageal reflux disease, unspecified whether esophagitis present  Comment: discussed doing an egd - she is hesitant. We talked about different options for heartburn including risks/benefits/side effects. The patient indicates understanding of these issues and agrees with the plan.   Plan:     (E66.811,  E66.09,  Z68.33) Class 1 obesity due to excess calories without serious comorbidity with body mass index (BMI) of 33.0 to 33.9 in adult  Comment: discussed weight loss meds - Discussed risks/benefits/side effects with the patient.  She will consider and let me know.   Plan:     Patient has been advised of split billing requirements and indicates understanding: Yes        BMI  Estimated body mass index is 33.27 kg/m  as calculated from the following:    Height as of this encounter: 1.68 m (5' 6.14\").    Weight as of " this encounter: 93.9 kg (207 lb).   Weight management plan: Discussed healthy diet and exercise guidelines    Counseling  Appropriate preventive services were addressed with this patient via screening, questionnaire, or discussion as appropriate for fall prevention, nutrition, physical activity, Tobacco-use cessation, social engagement, weight loss and cognition.  Checklist reviewing preventive services available has been given to the patient.  Reviewed patient's diet, addressing concerns and/or questions.   She is at risk for lack of exercise and has been provided with information to increase physical activity for the benefit of her well-being.         Soraida Hennessy is a 54 year old, presenting for the following:  Physical        4/29/2025     1:52 PM   Additional Questions   Roomed by TRISTIAN Tomlinson   Accompanied by Self          HPI            Hypertension Follow-up  Has been off 1 of her bp meds for the last 30 days- ran out.   Thinks it's amlodipine   Do you check your blood pressure regularly outside of the clinic? Yes   Are you following a low salt diet? Somewhat .  Does not eat processed foods.   Are your blood pressures ever more than 140 on the top number (systolic) OR more   than 90 on the bottom number (diastolic), for example 140/90? Yes sometimes     Hydrochlorothiazide 25  Losartan 100  Amlodipine 10       GERD - started to wean off the omeprazole   No symptoms x one week  Multiple family members with esophageal strictures     BP Readings from Last 2 Encounters:   04/29/25 (!) 142/92   04/12/24 138/86     Depression and Anxiety   How are you doing with your depression since your last visit? Stable   How are you doing with your anxiety since your last visit?  No issue with anxiety   Are you having other symptoms that might be associated with depression or anxiety? No  Have you had a significant life event? OTHER: 2 kids graduating, 1 from high school and 1 from college.     Do you have any  concerns with your use of alcohol or other drugs? Yes:  per patient, probably drinks more than should.    Wt Readings from Last 10 Encounters:   04/29/25 93.9 kg (207 lb)   04/12/24 90.1 kg (198 lb 9.6 oz)   12/05/22 79.8 kg (176 lb)   08/22/22 79.8 kg (176 lb)   05/31/22 81.2 kg (179 lb 1.6 oz)   07/27/21 84.5 kg (186 lb 4.8 oz)   03/04/21 89.8 kg (198 lb)   09/01/20 89.6 kg (197 lb 9.6 oz)   08/18/20 89.3 kg (196 lb 12.8 oz)   01/28/19 87.5 kg (193 lb)     Body mass index is 33.27 kg/m .  She is struggling to lose weight.     Social History     Tobacco Use    Smoking status: Never    Smokeless tobacco: Never   Vaping Use    Vaping status: Never Used   Substance Use Topics    Alcohol use: Yes    Drug use: Never         4/11/2024    10:18 PM 3/15/2025     1:54 PM 4/29/2025    11:22 AM   PHQ   PHQ-9 Total Score 5 5  4    Q9: Thoughts of better off dead/self-harm past 2 weeks Not at all Not at all Not at all       Patient-reported         4/12/2024    11:01 AM 3/15/2025     1:55 PM 4/29/2025    11:23 AM   ASHISH-7 SCORE   Total Score  0 (minimal anxiety) 0 (minimal anxiety)   Total Score 1 0  0        Patient-reported         4/29/2025    11:22 AM   Last PHQ-9   1.  Little interest or pleasure in doing things 0   2.  Feeling down, depressed, or hopeless 0   3.  Trouble falling or staying asleep, or sleeping too much 1   4.  Feeling tired or having little energy 1   5.  Poor appetite or overeating 1   6.  Feeling bad about yourself 1   7.  Trouble concentrating 0   8.  Moving slowly or restless 0   Q9: Thoughts of better off dead/self-harm past 2 weeks 0   PHQ-9 Total Score 4        Patient-reported       Suicide Assessment Five-step Evaluation and Treatment (SAFE-T)        Advance Care Planning    Patient states has Health Care Directive and will send to [facility].        4/29/2025   General Health   How would you rate your overall physical health? (!) FAIR   Feel stress (tense, anxious, or unable to sleep) Not at  all         4/29/2025   Nutrition   Three or more servings of calcium each day? Yes   Diet: Regular (no restrictions)   How many servings of fruit and vegetables per day? (!) 2-3   How many sweetened beverages each day? 0-1         4/29/2025   Exercise   Days per week of moderate/strenous exercise 1 day   Average minutes spent exercising at this level 20 min   (!) EXERCISE CONCERN      4/29/2025   Social Factors   Frequency of gathering with friends or relatives Once a week   Worry food won't last until get money to buy more No   Food not last or not have enough money for food? No   Do you have housing? (Housing is defined as stable permanent housing and does not include staying outside in a car, in a tent, in an abandoned building, in an overnight shelter, or couch-surfing.) Yes   Are you worried about losing your housing? No   Lack of transportation? No   Unable to get utilities (heat,electricity)? No         4/29/2025   Fall Risk   Fallen 2 or more times in the past year? Yes   Trouble with walking or balance? No   Gait Speed Test Interpretation Less than or equal to 5.00 seconds - PASS           4/29/2025   Dental   Dentist two times every year? Yes       Today's PHQ-9 Score:       4/29/2025    11:22 AM   PHQ-9 SCORE   PHQ-9 Total Score MyChart 4 (Minimal depression)   PHQ-9 Total Score 4        Patient-reported         4/29/2025   Substance Use   Alcohol more than 3/day or more than 7/wk No   Do you use any other substances recreationally? No     Social History     Tobacco Use    Smoking status: Never    Smokeless tobacco: Never   Vaping Use    Vaping status: Never Used   Substance Use Topics    Alcohol use: Yes    Drug use: Never                4/29/2025   STI Screening   New sexual partner(s) since last STI/HIV test? No     History of abnormal Pap smear:         Latest Ref Rng & Units 3/4/2021     1:57 PM 5/23/2016     9:29 AM   PAP / HPV   PAP Negative for squamous intraepithelial lesion or malignancy.  "Negative for squamous intraepithelial lesion or malignancy  Electronically signed by Leena Lopez CT (ASCP) on 3/15/2021 at  3:53 PM    Negative for squamous intraepithelial lesion or malignancy  Electronically signed by Donna Pineda CT (ASCP) on 5/31/2016 at 12:42 PM      HPV 16 DNA NEG Negative     HPV 18 DNA NEG Negative     Other HR HPV NEG Negative       ASCVD Risk   The 10-year ASCVD risk score (Suzanne BAH, et al., 2019) is: 3.2%    Values used to calculate the score:      Age: 54 years      Sex: Female      Is Non- : No      Diabetic: No      Tobacco smoker: No      Systolic Blood Pressure: 142 mmHg      Is BP treated: Yes      HDL Cholesterol: 73 mg/dL      Total Cholesterol: 299 mg/dL         Reviewed and updated as needed this visit by Provider                        Review of Systems  Constitutional, neuro, ENT, endocrine, pulmonary, cardiac, gastrointestinal, genitourinary, musculoskeletal, integument and psychiatric systems are negative, except as otherwise noted.     Objective    Exam  BP (!) 142/92   Pulse 97   Temp 97.4  F (36.3  C) (Tympanic)   Resp 16   Ht 1.68 m (5' 6.14\")   Wt 93.9 kg (207 lb)   SpO2 96%   BMI 33.27 kg/m     Estimated body mass index is 33.27 kg/m  as calculated from the following:    Height as of this encounter: 1.68 m (5' 6.14\").    Weight as of this encounter: 93.9 kg (207 lb).    Physical Exam  GENERAL: alert and no distress  EYES: Eyes grossly normal to inspection, PERRL and conjunctivae and sclerae normal  HENT: ear canals and TM's normal, nose and mouth without ulcers or lesions  NECK: no adenopathy, no asymmetry, masses, or scars  RESP: lungs clear to auscultation - no rales, rhonchi or wheezes  BREAST: normal without masses, tenderness or nipple discharge and no palpable axillary masses or adenopathy  CV: regular rate and rhythm, normal S1 S2, no S3 or S4, no murmur, click or rub, no peripheral edema  ABDOMEN: " soft, nontender, no hepatosplenomegaly, no masses and bowel sounds normal  MS: no gross musculoskeletal defects noted, no edema  SKIN: no suspicious lesions or rashes  NEURO: Normal strength and tone, mentation intact and speech normal  PSYCH: mentation appears normal, affect normal/bright        Signed Electronically by: Toya Licea MD

## 2025-04-29 NOTE — PATIENT INSTRUCTIONS
Options for continuing GLP1/GIP agonist in 2025:    Wegovy or Zepbound pens out of pocket cost (>$1000/month cash price without savings card)     Zepbound  Cost of any dose with savings card (link below to sign up): $650/month with card at any pharmacy   https://[email or web address]/enroll/checkEnrollment - Medicaid, Medicare or other government insurances cannot use savings cards    Wegovy   Cost of any dose with savings card (link below to sign up): $650/month with card at any pharmacy   https://www.[employer]/coverage-and-savings/save-on-wegovy.html - Medicaid, Medicare or other government insurances cannot use savings cards  Cost for any dose through [facility]: $499/month - This is [facility] mail order pharmacy   Terms and Conditions of Use  [facility]  Pharmacy     Compounded semaglutide (same active ingredient as Wegovy) from [facility] ($230/month for doses of 1mg or less; $370/month for doses higher than 1mg)  This is an available option only for as long as Wegovy is on FDA shortage list - Wegovy has been removed from FDA shortage list; [facility] will be compounding until 4/22/25.   Additional $5 per 10 pack of for administrations supplies (syringe/needles, etc)     Zepbound Vials through [facility] - vials only available in 2.5 mg, 5 mg, 7.5mg, 10 mg doses  [email or web address]  $349/month for 2.5mg vials  $499/month for 5mg vials   7.5 mg and 10 mg vials now available with as well for $499/month with regular refills (cost increases if refills not consistently filled at least every 45 days - see more info at [employer] Direct website)  Additional $5 per month for administrations supplies (syringe/needles, etc)           Patient Education   Preventive Care Advice   This is general advice given by our system to help you stay healthy. However, your care team may have specific advice just for you. Please talk to your  care team about your preventive care needs.  Nutrition  Eat 5 or more servings of fruits and vegetables each day.  Try wheat bread, brown rice and whole grain pasta (instead of white bread, rice, and pasta).  Get enough calcium and vitamin D. Check the label on foods and aim for 100% of the RDA (recommended daily allowance).  Lifestyle  Exercise at least 150 minutes each week  (30 minutes a day, 5 days a week).  Do muscle strengthening activities 2 days a week. These help control your weight and prevent disease.  No smoking.  Wear sunscreen to prevent skin cancer.  Have a dental exam and cleaning every 6 months.  Yearly exams  See your health care team every year to talk about:  Any changes in your health.  Any medicines your care team has prescribed.  Preventive care, family planning, and ways to prevent chronic diseases.  Shots (vaccines)   HPV shots (up to age 26), if you've never had them before.  Hepatitis B shots (up to age 59), if you've never had them before.  COVID-19 shot: Get this shot when it's due.  Flu shot: Get a flu shot every year.  Tetanus shot: Get a tetanus shot every 10 years.  Pneumococcal, hepatitis A, and RSV shots: Ask your care team if you need these based on your risk.  Shingles shot (for age 50 and up)  General health tests  Diabetes screening:  Starting at age 35, Get screened for diabetes at least every 3 years.  If you are younger than age 35, ask your care team if you should be screened for diabetes.  Cholesterol test: At age 39, start having a cholesterol test every 5 years, or more often if advised.  Bone density scan (DEXA): At age 50, ask your care team if you should have this scan for osteoporosis (brittle bones).  Hepatitis C: Get tested at least once in your life.  STIs (sexually transmitted infections)  Before age 24: Ask your care team if you should be screened for STIs.  After age 24: Get screened for STIs if you're at risk. You are at risk for STIs (including HIV) if:  You  are sexually active with more than one person.  You don't use condoms every time.  You or a partner was diagnosed with a sexually transmitted infection.  If you are at risk for HIV, ask about PrEP medicine to prevent HIV.  Get tested for HIV at least once in your life, whether you are at risk for HIV or not.  Cancer screening tests  Cervical cancer screening: If you have a cervix, begin getting regular cervical cancer screening tests starting at age 21.  Breast cancer scan (mammogram): If you've ever had breasts, begin having regular mammograms starting at age 40. This is a scan to check for breast cancer.  Colon cancer screening: It is important to start screening for colon cancer at age 45.  Have a colonoscopy test every 10 years (or more often if you're at risk) Or, ask your provider about stool tests like a FIT test every year or Cologuard test every 3 years.  To learn more about your testing options, visit:   .  For help making a decision, visit:   https://bit.ly/vw42643.  Prostate cancer screening test: If you have a prostate, ask your care team if a prostate cancer screening test (PSA) at age 55 is right for you.  Lung cancer screening: If you are a current or former smoker ages 50 to 80, ask your care team if ongoing lung cancer screenings are right for you.  For informational purposes only. Not to replace the advice of your health care provider. Copyright   2023 [facility]. All rights reserved. Clinically reviewed by the [facility] Transitions Program. DigitalMR [id number] - REV 01/24.  Preventing Falls: Care Instructions  Injuries and health problems such as trouble walking or poor eyesight can increase your risk of falling. So can some medicines. But there are things you can do to help prevent falls. You can exercise to get stronger. You can also arrange your home to make it safer.    Talk to your doctor about the medicines you take. Ask if any of them increase the risk of falls and  "whether they can be changed or stopped.   Try to exercise regularly. It can help improve your strength and balance. This can help lower your risk of falling.         Practice fall safety and prevention.   Wear low-heeled shoes that fit well and give your feet good support. Talk to your doctor if you have foot problems that make this hard.  Carry a cellphone or wear a medical alert device that you can use to call for help.  Use stepladders instead of chairs to reach high objects. Don't climb if you're at risk for falls. Ask for help, if needed.  Wear the correct eyeglasses, if you need them.        Make your home safer.   Remove rugs, cords, clutter, and furniture from walkways.  Keep your house well lit. Use night-lights in hallways and bathrooms.  Install and use sturdy handrails on stairways.  Wear nonskid footwear, even inside. Don't walk barefoot or in socks without shoes.        Be safe outside.   Use handrails, curb cuts, and ramps whenever possible.  Keep your hands free by using a shoulder bag or backpack.  Try to walk in well-lit areas. Watch out for uneven ground, changes in pavement, and debris.  Be careful in the winter. Walk on the grass or gravel when sidewalks are slippery. Use de-icer on steps and walkways. Add non-slip devices to shoes.    Put grab bars and nonskid mats in your shower or tub and near the toilet. Try to use a shower chair or bath bench when bathing.   Get into a tub or shower by putting in your weaker leg first. Get out with your strong side first. Have a phone or medical alert device in the bathroom with you.   Where can you learn more?  Go to https://www.[employer].net/patiented  Enter G117 in the search box to learn more about \"Preventing Falls: Care Instructions.\"  Current as of: July 31, 2024  Content Version: 14.4    [id number] [facility].   Care instructions adapted under license by your healthcare professional. If you have questions about a medical condition or " this instruction, always ask your healthcare professional. [employer], [facility] disclaims any warranty or liability for your use of this information.

## 2025-04-30 ENCOUNTER — PATIENT OUTREACH (OUTPATIENT)
Dept: CARE COORDINATION | Facility: CLINIC | Age: 55
End: 2025-04-30
Payer: COMMERCIAL

## 2025-05-20 ENCOUNTER — MYC MEDICAL ADVICE (OUTPATIENT)
Dept: FAMILY MEDICINE | Facility: CLINIC | Age: 55
End: 2025-05-20
Payer: COMMERCIAL

## 2025-05-20 NOTE — TELEPHONE ENCOUNTER
Bushra Meyer, Bushra Burgos MA      Follow up via [employer] in 3 weeks from today's visit=4/29/25.  Pt will record BP readings after 2 weeks of restarting amolodipine since being out of medication   Called [facility] Covid result line - [phone].   Left message - need to obtain Covid results

## 2025-05-29 NOTE — TELEPHONE ENCOUNTER
Patient Quality Outreach    Patient is due for the following:   Hypertension -  BP check and Hypertension follow-up visit    Action(s) Taken:   Schedule a nurse only visit for bp    Type of outreach:    [employer] MESSAGE HAS NOT BEEN READ BY PT,   Sent letter.    Questions for provider review:    None   {Resources   Quality Outreach Report :[id number]}      Bushra Meyer MA  Chart routed to None.  {Chart routing is optional. However, if routing is necessary, please follow the guidelines outlined in the Standard Work document. Note that charts will not be routed automatically--you must use the routing section or the staff message feature to ensure proper chart routing when needed. :[id number]}

## 2025-07-21 ENCOUNTER — TELEPHONE (OUTPATIENT)
Dept: FAMILY MEDICINE | Facility: CLINIC | Age: 55
End: 2025-07-21
Payer: COMMERCIAL

## 2025-07-21 NOTE — TELEPHONE ENCOUNTER
Patient Quality Outreach    Patient is due for the following:   Hypertension -  BP check and Hypertension follow-up visit    Action(s) Taken:   Schedule a nurse only visit for bp    Type of outreach:    Sent [employer] message. <<< No response. (See encounter form may 2025)  Sending letter.     Questions for provider review:    None         Bushra Meyer MA  Chart routed to None.

## 2025-07-21 NOTE — LETTER
July 21, 2025      Minoo Villavicencio  [id number] [address]        Dear Minoo,         It appears you are due to follow up on your blood pressure.    Please contact the clinic to schedule a nurse only visit to have your blood pressure checked.             Thank you,    [facility] Care Team  [phone]

## (undated) DEVICE — ENDO FORCEP ENDOJAW BIOPSY 3.7MMX230CM FB-222U